# Patient Record
Sex: FEMALE | Race: WHITE | URBAN - METROPOLITAN AREA
[De-identification: names, ages, dates, MRNs, and addresses within clinical notes are randomized per-mention and may not be internally consistent; named-entity substitution may affect disease eponyms.]

---

## 2023-11-20 ENCOUNTER — TELEPHONE (OUTPATIENT)
Age: 28
End: 2023-11-20

## 2023-11-20 NOTE — TELEPHONE ENCOUNTER
Patient called and stated that she was released from the hospital yesterday and was tolds to make a hematology appt as soon as possible. Patient stated that she has a blood clot in her lung and really needs an appt. Informed patient that I would send a message to the referral coordinator who would reach back out to her to make an appt.        # [phone]

## 2023-11-21 NOTE — TELEPHONE ENCOUNTER
Spoke with patient regarding her hospital visit. She informed me that she was seen at [facility], and she is on an eliquis starter pack, iron supplements, and a antibiotic (Cefdinir). I informed her that we would need the records from [id number] [facility] prior to scheduling, she stated that she will give them a call to see what she can do.

## 2023-11-30 ENCOUNTER — TELEPHONE (OUTPATIENT)
Age: 28
End: 2023-11-30

## 2023-11-30 NOTE — TELEPHONE ENCOUNTER
Pt called and stated she was diagnosed with  pulmonary embolism at the ER and she was having chest pains and her New Patient Apt was with you was 12\1\2023 patient was asked to use her own decreation and go back to the ER because it maybe an Emergency pt bluntly hung up the phone. Then her mom called back and she was told the same thing mom also asked would the  Know that they called I told her yes there will be a message put back that she called in and she bluntly hung up.

## 2023-12-01 ENCOUNTER — OFFICE VISIT (OUTPATIENT)
Age: 28
End: 2023-12-01
Payer: MEDICAID

## 2023-12-01 VITALS
DIASTOLIC BLOOD PRESSURE: 76 MMHG | BODY MASS INDEX: 41.37 KG/M2 | SYSTOLIC BLOOD PRESSURE: 126 MMHG | HEIGHT: 59 IN | WEIGHT: 205.2 LBS

## 2023-12-01 DIAGNOSIS — R07.81 PLEURITIC PAIN: ICD-10-CM

## 2023-12-01 DIAGNOSIS — I26.93 SINGLE SUBSEGMENTAL PULMONARY EMBOLISM WITHOUT ACUTE COR PULMONALE (HCC): Primary | ICD-10-CM

## 2023-12-01 DIAGNOSIS — J90 PLEURAL EFFUSION: ICD-10-CM

## 2023-12-01 DIAGNOSIS — I10 ESSENTIAL HYPERTENSION: ICD-10-CM

## 2023-12-01 PROCEDURE — 3078F DIAST BP <80 MM HG: CPT | Performed by: INTERNAL MEDICINE

## 2023-12-01 PROCEDURE — 99205 OFFICE O/P NEW HI 60 MIN: CPT | Performed by: INTERNAL MEDICINE

## 2023-12-01 PROCEDURE — 3074F SYST BP LT 130 MM HG: CPT | Performed by: INTERNAL MEDICINE

## 2023-12-01 RX ORDER — DICLOFENAC SODIUM 30 MG/G
2 GEL TOPICAL 3 TIMES DAILY
Qty: 100 G | Refills: 3 | Status: SHIPPED | OUTPATIENT
Start: 2023-12-01

## 2023-12-01 RX ORDER — IBUPROFEN 800 MG/1
800 TABLET ORAL EVERY 6 HOURS PRN
COMMUNITY
End: 2023-12-01

## 2023-12-01 RX ORDER — LABETALOL 300 MG/1
300 TABLET, FILM COATED ORAL 2 TIMES DAILY
Qty: 60 TABLET | Refills: 5 | Status: SHIPPED | OUTPATIENT
Start: 2023-12-01 | End: 2023-12-01 | Stop reason: SDUPTHER

## 2023-12-01 RX ORDER — POLYETHYLENE GLYCOL 3350 17 G/17G
17 POWDER, FOR SOLUTION ORAL DAILY
COMMUNITY

## 2023-12-01 RX ORDER — LABETALOL 300 MG/1
300 TABLET, FILM COATED ORAL 2 TIMES DAILY
Qty: 60 TABLET | Refills: 5 | Status: SHIPPED | OUTPATIENT
Start: 2023-12-01

## 2023-12-01 RX ORDER — FERROUS SULFATE 325(65) MG
325 TABLET ORAL
COMMUNITY

## 2023-12-01 RX ORDER — LABETALOL 300 MG/1
300 TABLET, FILM COATED ORAL 2 TIMES DAILY
COMMUNITY
End: 2023-12-01 | Stop reason: SDUPTHER

## 2023-12-01 RX ORDER — LIDOCAINE 50 MG/G
1 PATCH TOPICAL DAILY
Qty: 30 PATCH | Refills: 3 | Status: SHIPPED | OUTPATIENT
Start: 2023-12-01 | End: 2023-12-01 | Stop reason: SDUPTHER

## 2023-12-01 RX ORDER — DICLOFENAC SODIUM 30 MG/G
2 GEL TOPICAL 3 TIMES DAILY
Qty: 100 G | Refills: 3 | Status: SHIPPED | OUTPATIENT
Start: 2023-12-01 | End: 2023-12-01 | Stop reason: SDUPTHER

## 2023-12-01 RX ORDER — LIDOCAINE 50 MG/G
1 PATCH TOPICAL DAILY
Qty: 30 PATCH | Refills: 3 | Status: SHIPPED | OUTPATIENT
Start: 2023-12-01 | End: 2024-03-30

## 2023-12-01 SDOH — ECONOMIC STABILITY: FOOD INSECURITY: WITHIN THE PAST 12 MONTHS, THE FOOD YOU BOUGHT JUST DIDN'T LAST AND YOU DIDN'T HAVE MONEY TO GET MORE.: NEVER TRUE

## 2023-12-01 SDOH — ECONOMIC STABILITY: HOUSING INSECURITY
IN THE LAST 12 MONTHS, WAS THERE A TIME WHEN YOU DID NOT HAVE A STEADY PLACE TO SLEEP OR SLEPT IN A SHELTER (INCLUDING NOW)?: NO

## 2023-12-01 SDOH — ECONOMIC STABILITY: INCOME INSECURITY: HOW HARD IS IT FOR YOU TO PAY FOR THE VERY BASICS LIKE FOOD, HOUSING, MEDICAL CARE, AND HEATING?: NOT HARD AT ALL

## 2023-12-01 SDOH — ECONOMIC STABILITY: FOOD INSECURITY: WITHIN THE PAST 12 MONTHS, YOU WORRIED THAT YOUR FOOD WOULD RUN OUT BEFORE YOU GOT MONEY TO BUY MORE.: NEVER TRUE

## 2023-12-01 ASSESSMENT — PATIENT HEALTH QUESTIONNAIRE - PHQ9
1. LITTLE INTEREST OR PLEASURE IN DOING THINGS: 0
2. FEELING DOWN, DEPRESSED OR HOPELESS: 0
SUM OF ALL RESPONSES TO PHQ QUESTIONS 1-9: 0
SUM OF ALL RESPONSES TO PHQ9 QUESTIONS 1 & 2: 0

## 2023-12-01 NOTE — PATIENT INSTRUCTIONS
Please call neurology (on referral/note from hospitalization) to schedule follow-up there. You can schedule in 3 or more months, as they wanted to see you after completed anticoagulation/Eliquis therapy.

## 2023-12-01 NOTE — PROGRESS NOTES
Teresita Wong (: 1995) is a 33 y.o. female, new patient, here for evaluation of the following chief complaint(s):  Chief Complaint   Patient presents with    New Patient       Assessment and Plan:      Diagnosis Orders   1. Single subsegmental pulmonary embolism without acute cor pulmonale (HCC)  [facility] - Hayde Good MD, Hematology/Oncology, [employer]    apixaban (ELIQUIS) 5 MG TABS tablet    DISCONTINUED: apixaban (ELIQUIS) 5 MG TABS tablet      2. Essential hypertension  labetalol (NORMODYNE) 300 MG tablet    DISCONTINUED: labetalol (NORMODYNE) 300 MG tablet      3. Pleural effusion  [facility] - Hayde Good MD, Hematology/Oncology, [employer]      4. Pleuritic pain  lidocaine (LIDODERM) 5 %    Diclofenac Sodium 3 % GEL    DISCONTINUED: lidocaine (LIDODERM) 5 %    DISCONTINUED: Diclofenac Sodium 3 % GEL          1:  Reviewed dosing and duration with pt and mom at visit. Plan hematology referral to clarify duration. Noted that on interval imaging at [facility] facility, PE has resolved. 2:  Refilled BP medication, as not OB/Gyn provider will not likely be managing in future. 3,4:  Monitor clinically--imaging reviewed from admission and interval follow-up visits. 4:  Medication(s), management and follow-up based on response reviewed at visit. Symptomatic management reviewed at visit. Reviewed typical course of illness, duration of symptoms, and exam findings. Medication dosing, side effects and alternatives reviewed. Return in about 4 weeks (around 2023) for Blood Pressure follow-up, referral follow-up--4-6 weeks, --needs 30min visit.  lab results and schedule of future lab studies reviewed with patient  reviewed medications and side effects in detail  radiology results and schedule of future radiology studies reviewed with patient    For additional documentation of information and/or recommendations discussed this visit, please see notes in instructions.     Plan and evaluation

## 2023-12-01 NOTE — PROGRESS NOTES
Reviewed history after retrieving [facility] documents prior to new pt visit for hospital follow-up. [id number] [facility] Hospitalization   Admit 11-16-23 through 11-18-23. 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 Transitioned to NOAC since not breastfeeding. Free Text DxA&P Notes  Free text DxA&P notes:  assessment/plan:  acute R distal pulmonary embolism  start full dose lovenox 1mg/kg sc bid  montior hgb w am labs  transition to noac upon dc- not breastfeeding. CM consulted for affordability eval.   f/u echo to eval for heart strain. severe sepsis with fever, tachycardia, leukocytosis, and end organ damage (AVA), improved  AVA, tachycardia, leukocytosis, fever resolved. lactic 2.1 on arrival, <2 after IVF ressuscitation  blood cultures pending  no pna on cta chest, pe's noted  on arrival abdomen mildly tender mostly tenderness on R back/ lower chest, normal bowel sounds, having bowel movements, no evidence of ileus,  no pain w eating, abdominal pain resolved  ct ab pelv showed dilated small bowel loops w/o transition point suspicios for small bowel ileus, no fluid collection, divertiulitis, or colitis reported  UA slightly positive, see below  Cdiff negative  continue ivf and rocephen for now, patient is improving nicely on current regimen.      UTI  slight

## 2023-12-01 NOTE — PROGRESS NOTES
Reviewed history after retrieving [facility] documents prior to new pt visit for hospital follow-up. [id number] [facility] visit 11-30-23:    CXR normal.    CT C/A/P:  Findings:    Chest CT:  Pulmonary Arteries: The main pulmonary artery and its visualized  proximal branches demonstrate no filling defects. The smaller branches  of bilateral pulmonary arteries are not evaluated secondary to  respiratory motion artifact. Heart/Mediastinum:  Heart size is within normal limits. No pericardial  effusion. No pathologic mediastinal or hilar adenopathy. Lungs/Pleura: Trace bilateral pleural effusions have mildly decreased. Vasculature: Aorta and pulmonary arteries are normal in size and  contour. Soft tissues: No acute soft tissue abnormalities. Osseous structures: No acute fracture. No evidence of suspicious  lytic or blastic lesion. Abdomen and pelvis CT:  Liver: No morphologic cirrhosis. No concerning focal hepatic lesions. Biliary:The gallbladder is unremarkable. No intrahepatic or  extrahepatic biliary ductal dilatation. Spleen: The spleen is normal in size without focal lesion. Pancreas: No solid mass. No main pancreatic ductal dilation. Adrenal glands: Unremarkable. Kidneys: The kidneys are normal in size and are symmetric. No  hydronephrosis. No renal calculi. No suspicious renal parenchymal  lesion. Stomach: Unremarkable. Bowel: No evidence of small or large bowel obstruction. Appendix: Appendix is not visualized. Peritoneal Cavity: No free intraperitoneal fluid or air. No  pathologic adenopathy. Retroperitoneum: No abdominal aortic aneurysm. No pathologic  retroperitoneal adenopathy. Pelvis:  Bladder: Unremarkable without focal wall thickening or mass. Reproductive Organs: Right ovarian 3.1 cm cyst, most likely follicle. Other:  Osseous structures: Bilateral pars defects at L5 without significant  spondylolisthesis. No evidence of suspicious lytic or blastic lesion.   Soft Tissues: New

## 2023-12-04 ENCOUNTER — TELEPHONE (OUTPATIENT)
Age: 28
End: 2023-12-04

## 2023-12-04 NOTE — TELEPHONE ENCOUNTER
PA for Lidocaine 5% patches  was sent to Ins plan today and came back saying Submit Bill To Other Processor Or Primary Payer

## 2023-12-04 NOTE — TELEPHONE ENCOUNTER
PA for Diclofenac Sodium 3% gel  was sent to Ins plan today and came back saying Submit Bill To Other Processor Or Primary Payer

## 2023-12-05 ENCOUNTER — TELEPHONE (OUTPATIENT)
Age: 28
End: 2023-12-05

## 2023-12-05 NOTE — TELEPHONE ENCOUNTER
Called patient to set up new patient appt. No answer.  Mailbox full    Np / PE in ER aidan / Wyatt Rick

## 2024-01-08 ENCOUNTER — OFFICE VISIT (OUTPATIENT)
Age: 29
End: 2024-01-08
Payer: MEDICAID

## 2024-01-08 VITALS
BODY MASS INDEX: 39.72 KG/M2 | SYSTOLIC BLOOD PRESSURE: 128 MMHG | OXYGEN SATURATION: 99 % | DIASTOLIC BLOOD PRESSURE: 82 MMHG | HEART RATE: 61 BPM | TEMPERATURE: 97.4 F | WEIGHT: 197 LBS | HEIGHT: 59 IN

## 2024-01-08 DIAGNOSIS — I26.93 SINGLE SUBSEGMENTAL PULMONARY EMBOLISM WITHOUT ACUTE COR PULMONALE (HCC): Primary | ICD-10-CM

## 2024-01-08 DIAGNOSIS — D64.9 ANEMIA, UNSPECIFIED TYPE: ICD-10-CM

## 2024-01-08 DIAGNOSIS — I10 ESSENTIAL HYPERTENSION: ICD-10-CM

## 2024-01-08 PROCEDURE — 3079F DIAST BP 80-89 MM HG: CPT | Performed by: INTERNAL MEDICINE

## 2024-01-08 PROCEDURE — 99215 OFFICE O/P EST HI 40 MIN: CPT | Performed by: INTERNAL MEDICINE

## 2024-01-08 PROCEDURE — 3074F SYST BP LT 130 MM HG: CPT | Performed by: INTERNAL MEDICINE

## 2024-01-08 RX ORDER — LABETALOL 300 MG/1
300 TABLET, FILM COATED ORAL 2 TIMES DAILY
Qty: 180 TABLET | Refills: 1 | Status: SHIPPED | OUTPATIENT
Start: 2024-01-08

## 2024-01-08 ASSESSMENT — PATIENT HEALTH QUESTIONNAIRE - PHQ9
1. LITTLE INTEREST OR PLEASURE IN DOING THINGS: 1
SUM OF ALL RESPONSES TO PHQ QUESTIONS 1-9: 4
8. MOVING OR SPEAKING SO SLOWLY THAT OTHER PEOPLE COULD HAVE NOTICED. OR THE OPPOSITE, BEING SO FIGETY OR RESTLESS THAT YOU HAVE BEEN MOVING AROUND A LOT MORE THAN USUAL: 0
7. TROUBLE CONCENTRATING ON THINGS, SUCH AS READING THE NEWSPAPER OR WATCHING TELEVISION: 0
5. POOR APPETITE OR OVEREATING: 1
2. FEELING DOWN, DEPRESSED OR HOPELESS: 1
9. THOUGHTS THAT YOU WOULD BE BETTER OFF DEAD, OR OF HURTING YOURSELF: 0
3. TROUBLE FALLING OR STAYING ASLEEP: 0
10. IF YOU CHECKED OFF ANY PROBLEMS, HOW DIFFICULT HAVE THESE PROBLEMS MADE IT FOR YOU TO DO YOUR WORK, TAKE CARE OF THINGS AT HOME, OR GET ALONG WITH OTHER PEOPLE: 0
SUM OF ALL RESPONSES TO PHQ QUESTIONS 1-9: 4
6. FEELING BAD ABOUT YOURSELF - OR THAT YOU ARE A FAILURE OR HAVE LET YOURSELF OR YOUR FAMILY DOWN: 0
SUM OF ALL RESPONSES TO PHQ QUESTIONS 1-9: 4
SUM OF ALL RESPONSES TO PHQ9 QUESTIONS 1 & 2: 2
SUM OF ALL RESPONSES TO PHQ QUESTIONS 1-9: 4
4. FEELING TIRED OR HAVING LITTLE ENERGY: 1

## 2024-01-08 NOTE — PROGRESS NOTES
RM17    Chief Complaint   Patient presents with    Follow-up     Pt stated she has a question about her hemoglobin beacuse she has a heavy period.       Vitals:    01/08/24 1409   BP: 128/82   Site: Left Upper Arm   Position: Sitting   Pulse: 61   Temp: 97.4 °F (36.3 °C)   TempSrc: Oral   SpO2: 99%   Weight: 89.4 kg (197 lb)   Height: 1.499 m (4' 11\")       \"Have you been to the ER, urgent care clinic since your last visit?  Hospitalized since your last visit?\"    NO    “Have you seen or consulted any other health care providers outside of [facility] since your last visit?”    NO    Health Maintenance Due   Topic Date Due    Hepatitis B vaccine (1 of 3 - 3-dose series) Never done    COVID-19 Vaccine (1) Never done    Varicella vaccine (1 of 2 - 2-dose childhood series) Never done    Pneumococcal 0-64 years Vaccine (1 - PCV) Never done    HIV screen  Never done    Hepatitis C screen  Never done    DTaP/Tdap/Td vaccine (1 - Tdap) Never done    Pap smear  Never done    Flu vaccine (1) Never done       AVS  education, follow up, and recommendations provided and addressed with patient.  services used to advise patient No

## 2024-01-08 NOTE — PATIENT INSTRUCTIONS
Please see gynecology as reviewed for heavier periods on Eliquis.    The labs done today should result in 24-48 hours.  Will have clinic call you with results.  We can fax to your gynecologist Dr. Boyce as well--at the [facility] location.

## 2024-01-08 NOTE — PROGRESS NOTES
Trang Vitale (: 1995) is a 28 y.o. female, established patient, here for evaluation of the following chief complaint(s):  Chief Complaint   Patient presents with    Follow-up     Pt stated she has a question about her hemoglobin bec she has heavy period.       Assessment and Plan:      Diagnosis Orders   1. Single subsegmental pulmonary embolism without acute cor pulmonale (HCC)  CBC with Auto Differential    Iron and TIBC    Ferritin      2. Anemia, unspecified type        3. Essential hypertension  labetalol (NORMODYNE) 300 MG tablet          1-2:  Lab monitoring reviewed with heavier menses on Eliquis.  Messaged hematology with staff message during visit to see if can see sooner--at [facility] location or with another location if available sooner.    There are other alternate locations as below--reviewed with pt at visit.    Continue current Eliquis pending labs and hematology evaluation.  Reviewed may be able to consider shorter than 6mo course since PE had resolved on prior imaging, pending further eval with hematology.    3:  Continue Labetalol--good control and no problems noted with medication or BID dosing.  Refill for 90-day supply reviewed.      Return in about 3 months (around 3/30/2024) for medication follow-up, --needs 30min visit, referral follow-up, Blood Pressure follow-up.  lab results and schedule of future lab studies reviewed with patient  reviewed medications and side effects in detail    For additional documentation of information and/or recommendations discussed this visit, please see notes in instructions.    Plan and evaluation (above) reviewed with pt at visit  Patient voiced understanding of plan and provided with time to ask/review questions.  After Visit Summary (AVS) provided to pt after visit with additional instructions as needed/reviewed.      Future Appointments   Date Time Provider Department Center   3/14/2024  1:00 PM Yonathan Cruz MD [facility]   --Updated

## 2024-01-10 LAB
BASOPHILS # BLD AUTO: 0.1 X10E3/UL (ref 0–0.2)
BASOPHILS NFR BLD AUTO: 1 %
EOSINOPHIL # BLD AUTO: 0.4 X10E3/UL (ref 0–0.4)
EOSINOPHIL NFR BLD AUTO: 5 %
ERYTHROCYTE [DISTWIDTH] IN BLOOD BY AUTOMATED COUNT: 14.9 % (ref 11.7–15.4)
FERRITIN SERPL-MCNC: 22 NG/ML (ref 15–150)
HCT VFR BLD AUTO: 39.2 % (ref 34–46.6)
HGB BLD-MCNC: 12.1 G/DL (ref 11.1–15.9)
IMM GRANULOCYTES # BLD AUTO: 0 X10E3/UL (ref 0–0.1)
IMM GRANULOCYTES NFR BLD AUTO: 0 %
IRON SATN MFR SERPL: 26 % (ref 15–55)
IRON SERPL-MCNC: 96 UG/DL (ref 27–159)
LYMPHOCYTES # BLD AUTO: 2.7 X10E3/UL (ref 0.7–3.1)
LYMPHOCYTES NFR BLD AUTO: 31 %
MCH RBC QN AUTO: 26.6 PG (ref 26.6–33)
MCHC RBC AUTO-ENTMCNC: 30.9 G/DL (ref 31.5–35.7)
MCV RBC AUTO: 86 FL (ref 79–97)
MONOCYTES # BLD AUTO: 0.6 X10E3/UL (ref 0.1–0.9)
MONOCYTES NFR BLD AUTO: 7 %
NEUTROPHILS # BLD AUTO: 5 X10E3/UL (ref 1.4–7)
NEUTROPHILS NFR BLD AUTO: 56 %
PLATELET # BLD AUTO: 288 X10E3/UL (ref 150–450)
RBC # BLD AUTO: 4.55 X10E6/UL (ref 3.77–5.28)
TIBC SERPL-MCNC: 367 UG/DL (ref 250–450)
UIBC SERPL-MCNC: 271 UG/DL (ref 131–425)
WBC # BLD AUTO: 8.8 X10E3/UL (ref 3.4–10.8)

## 2024-01-15 ENCOUNTER — OFFICE VISIT (OUTPATIENT)
Age: 29
End: 2024-01-15
Payer: MEDICAID

## 2024-01-15 VITALS
WEIGHT: 199.6 LBS | HEART RATE: 62 BPM | SYSTOLIC BLOOD PRESSURE: 116 MMHG | BODY MASS INDEX: 40.24 KG/M2 | DIASTOLIC BLOOD PRESSURE: 75 MMHG | HEIGHT: 59 IN | OXYGEN SATURATION: 98 % | RESPIRATION RATE: 16 BRPM | TEMPERATURE: 97.3 F

## 2024-01-15 DIAGNOSIS — D68.59 HYPERCOAGULABLE STATE (HCC): ICD-10-CM

## 2024-01-15 DIAGNOSIS — D68.59 HYPERCOAGULABLE STATE (HCC): Primary | ICD-10-CM

## 2024-01-15 DIAGNOSIS — Z72.0 CURRENT EVERY DAY NICOTINE VAPING: ICD-10-CM

## 2024-01-15 PROCEDURE — 99204 OFFICE O/P NEW MOD 45 MIN: CPT | Performed by: INTERNAL MEDICINE

## 2024-01-15 RX ORDER — LURASIDONE HYDROCHLORIDE 60 MG/1
60 TABLET, FILM COATED ORAL
COMMUNITY

## 2024-01-15 ASSESSMENT — PATIENT HEALTH QUESTIONNAIRE - PHQ9
SUM OF ALL RESPONSES TO PHQ QUESTIONS 1-9: 1
2. FEELING DOWN, DEPRESSED OR HOPELESS: 1
1. LITTLE INTEREST OR PLEASURE IN DOING THINGS: 0
SUM OF ALL RESPONSES TO PHQ QUESTIONS 1-9: 1
SUM OF ALL RESPONSES TO PHQ9 QUESTIONS 1 & 2: 1

## 2024-01-15 NOTE — PROGRESS NOTES
[facility] [place] [facility]  [address], [address] [facility] [address]  W: [phone]  F: [phone] Patient ID  Name: Trang Vitale  YOB: 1995  MRN: [id number]  Referring Provider:   Alexsander Kang MD  [address] [place] [address]  Primary Care Provider:   Alexsander Kang MD       HEMATOLOGY/MEDICAL ONCOLOGY  NOTE     Reason for Evaluation:     Chief Complaint   Patient presents with    New Patient     Hematology History:   Please review original records for clinical decision making. This summary highlights focused aspects of patient's ongoing care and may have a recurring section in notes with either updates or remain unchanged as a longitudinal care summary.  ______________________________________________________  DIAGNOSIS: PULMONARY EMBOLISM  10/4/23: .  23:  ER Right Distal Pulmonary Artery Positive for PE but no heart strain per ER notes reviewed.  23:  ER CT for PE: negative  23:  ER presentation at  for chest pain/flank pain.    Subjective:     History of Present Illness:   Date of Visit: 01/15/24   Trang Vitale is a 28 y.o. female who presents for an initial evaluation for HYPERCOAGULABLE STATE.  She notes that she was hosptialized for initial PE and was started on a heparin gtt then switched to lovenox. She notes being on lovenox for about 4 days then switched to Eliquis. She has been taking Eliquis and denies any bleeding other than heavier periods. She continues on iron. She notes that she had her iron studies checked. She notes that she had iron studies with primary care clinic.    She reportedly had two post-partum hemorrhages in October after her . She delivered at [facility]. She reportedly was on Magnesium after delivery. Patient and her mom notes that she did not have any known clotting medications after her bleeding.  She delivered with Dr. Laya Pizarro (at

## 2024-01-15 NOTE — PROGRESS NOTES
Chief Complaint   Patient presents with    New Patient           Vitals:    01/15/24 1120   BP: 116/75   Pulse: 62   Resp: 16   Temp: 97.3 °F (36.3 °C)   SpO2: 98%            1. Have you been to the ER, urgent care clinic since your last visit?  Hospitalized since your last visit?  New Patient  2. Have you seen or consulted any other health care providers outside of the [facility] since your last visit?  Include any pap smears or colon screening. New Patient

## 2024-01-18 PROBLEM — D68.59 HYPERCOAGULABLE STATE (HCC): Status: ACTIVE | Noted: 2024-01-18

## 2024-02-23 ENCOUNTER — OFFICE VISIT (OUTPATIENT)
Age: 29
End: 2024-02-23

## 2024-02-23 DIAGNOSIS — Z86.711 HISTORY OF PULMONARY EMBOLISM: Primary | ICD-10-CM

## 2024-02-23 NOTE — PROGRESS NOTES
Trang Vitale  :  1995    Diagnoses and all orders for this visit:    History of pulmonary embolism--did not come for scheduled visit today.        Pre-chartin24:      She has lab orders and follow-up with hematology as below but labs not done yet.    Future Appointments   Date Time Provider Department Center   2024  1:45 PM Yonathan Cruz MD [facility]     Labs ordered by hematology:        [facility] records reviewed prior to visit (pt did not come to scheduled visit today).  2-15-24:  ER Provider report:      Text/Dict Note Patient presents for right-sided chest wall/rib pain that worsens with movement. She describes it as a spasm like sensation. Patient does have history of PE but is compliant with her anticoagulation. She is concerned because she states that is similar to previous pain she had with a PE. Plan for baseline labs, CT imaging of the chest, and will reassess. Labs overall reassuring. Screening troponin negative. EKG without acute ischemia. CTA of the chest without acute process or evidence of pulmonary embolism. Suspect patient likely has muscle strain/spasm causing her discomfort. She describes it as a spasm like sensation. She also states she has been sleeping awkwardly as she has had her child in the bed with a slight cough. Discussed continued symptomatic management home with acetaminophen, muscle relaxant, topical lidocaine patches. At this time do not believe further workup or imaging is indicated. Do not suspect ACS.    Central Nervous System Agents   Patient Discharge & Departure Vital Signs/Condition   Vital Signs First Documented: Last Documented: All vital signs available at the time of this entry have been reviewed.   Condition Stable Clinical Impression   Clinical Impression   Medication Dose Sig/Devonte Route Start time Stop Time Status Last Admin   --Ketorolac Tromethamine 15 MG X1 (ED) STA IV 02/15 1549 02/15 1550 DC 02/15 1610 Result Date Time

## 2024-02-25 NOTE — PROGRESS NOTES
[facility] [place] at [facility]  [address], [address] [facility] [address]  W: [phone]  F: [phone] Patient ID  Name: Trang Vitale  YOB: 1995  MRN: [id number]  Referring Provider:   No referring provider defined for this encounter.  Primary Care Provider:   Alexsander Kang MD       HEMATOLOGY/MEDICAL ONCOLOGY  NOTE     Reason for Evaluation:     No chief complaint on file.    Hematology History:   Please review original records for clinical decision making. This summary highlights focused aspects of patient's ongoing care and may have a recurring section in notes with either updates or remain unchanged as a longitudinal care summary.  ______________________________________________________  DIAGNOSIS: PULMONARY EMBOLISM  10/4/23: .  23:  ER Right Distal Pulmonary Artery Positive for PE but no heart strain per ER notes reviewed.  23:  ER CT for PE: negative  23:  ER presentation at  for chest pain/flank pain.    MRV Head Without Contrast (23) at OSH: IMPRESSION: significantly diminished flow-related enhancement of left transverse and sigmoid dural venous sinnuses which may be related to congenital hypoplasia, severe setnosis, or chronic partial occlusion/thrombosis (less likely) and high-grade focal stenosis of the right transverse dural venous sinus.      Subjective:     History of Present Illness:   DATE OF VISIT: 24   Trang Vitale is a 28 y.o. female who presents for a follow-up evaluation for Hypercoagulable state with history of hospitalization for PE in 2023.  Feels well today, takes Eliquis BID as prescribed.   No hospitalizations or blood clots since last visit.   Reports occasional headaches she attributes to stress and history of migraines, these are worse around her periods.    Denies current pregnancy, s/p tubal ligation().  States heavy periods occur with Eliquis but manageable overall.

## 2024-02-26 ENCOUNTER — OFFICE VISIT (OUTPATIENT)
Age: 29
End: 2024-02-26
Payer: MEDICAID

## 2024-02-26 VITALS
HEIGHT: 59 IN | TEMPERATURE: 98 F | HEART RATE: 81 BPM | DIASTOLIC BLOOD PRESSURE: 77 MMHG | BODY MASS INDEX: 40.8 KG/M2 | SYSTOLIC BLOOD PRESSURE: 128 MMHG | OXYGEN SATURATION: 97 % | WEIGHT: 202.4 LBS | RESPIRATION RATE: 22 BRPM

## 2024-02-26 DIAGNOSIS — D68.59 HYPERCOAGULABLE STATE (HCC): Primary | ICD-10-CM

## 2024-02-26 DIAGNOSIS — Z72.0 CURRENT EVERY DAY NICOTINE VAPING: ICD-10-CM

## 2024-02-26 PROCEDURE — 99214 OFFICE O/P EST MOD 30 MIN: CPT

## 2024-02-26 NOTE — PROGRESS NOTES
Chief Complaint   Patient presents with    Follow-up           Vitals:    02/26/24 1405   BP: 128/77   Pulse: 81   Resp: 22   Temp: 98 °F (36.7 °C)   SpO2: 97%            1. Have you been to the ER, urgent care clinic since your last visit?  Hospitalized since your last visit?  No  2. Have you seen or consulted any other health care providers outside of the [facility] since your last visit?  Include any pap smears or colon screening. No

## 2024-02-28 LAB
AT III ACT/NOR PPP CHRO: 123 % (ref 75–135)
D DIMER PPP FEU-MCNC: <0.2 MG/L FEU (ref 0–0.49)
PROT C ACT/NOR PPP: 126 % (ref 73–180)
PROT S ACT/NOR PPP: 77 % (ref 63–140)
PROT S AG ACT/NOR PPP IA: 76 % (ref 60–150)
PROT S FREE AG ACT/NOR PPP IA: 71 % (ref 61–136)

## 2024-02-29 LAB
B2 GLYCOPROT1 IGA SER-ACNC: <9 GPI IGA UNITS (ref 0–25)
B2 GLYCOPROT1 IGG SER-ACNC: <9 GPI IGG UNITS (ref 0–20)
B2 GLYCOPROT1 IGM SER-ACNC: <9 GPI IGM UNITS (ref 0–32)
CARDIOLIPIN IGA SER IA-ACNC: <9 APL U/ML (ref 0–11)
CARDIOLIPIN IGG SER IA-ACNC: <9 GPL U/ML (ref 0–14)
CARDIOLIPIN IGM SER IA-ACNC: <9 MPL U/ML (ref 0–12)

## 2024-03-01 LAB — PROT C AG ACT/NOR PPP IA: 130 % (ref 60–150)

## 2024-03-07 LAB
APTT HEX PL PPP: 6 SEC
APTT IMM NP PPP: ABNORMAL SEC
APTT PPP 1:1 SALINE: ABNORMAL SEC
APTT PPP: 27.4 SEC
B2 GLYCOPROT1 IGA SER-ACNC: <10 SAU
B2 GLYCOPROT1 IGG SER-ACNC: <10 SGU
B2 GLYCOPROT1 IGM SER-ACNC: <10 SMU
CARDIOLIPIN IGG SER IA-ACNC: <10 GPL
CARDIOLIPIN IGM SER IA-ACNC: <10 MPL
CONFIRM APTT: 0.5 SEC
CONFIRM DRVVT: 45.5 SEC
DRVVT SCREEN TO CONFIRM RATIO: 1 RATIO
INR PPP: 1 RATIO
LABORATORY COMMENT REPORT: ABNORMAL
PROTHROMBIN TIME: 10.5 SEC
SCREEN DRVVT: 52 SEC
THROMBIN TIME: 17.3 SEC

## 2024-03-11 LAB
F2 C.20210G>A GENO BLD/T: NORMAL
F5 P.R506Q BLD/T QL: NORMAL
IMP & REVIEW OF LAB RESULTS: NORMAL
IMP & REVIEW OF LAB RESULTS: NORMAL

## 2024-03-25 ENCOUNTER — TELEPHONE (OUTPATIENT)
Age: 29
End: 2024-03-25

## 2024-03-25 NOTE — TELEPHONE ENCOUNTER
Patient called back and stated her insurance dropped her so she's unable to make appointments and get her medications. Patient would like to be advised on what to do about her blood thinners.   CB#[phone]

## 2024-03-25 NOTE — TELEPHONE ENCOUNTER
3/25/24- Called [facility] to check status of patients Eliquis prescription and to see if patient is still able to fill this. Per the pharmacy tech patient had this order sent to another [facility] but it has a $0 copay and patient is able to fill at [facility] today. Informed pharmacy tech that this user will reach out to patient to see what she would like to do and for patient to reach out to [facility].     Called patient and left a voicemail with the above information but also requested a call back from patient. Awaiting return call.    Patient called back stating she received a letter that [employer] is dropping her insurance and now she is signed up for a Medicare plan but it doesn't cover any prescriptions or doctors visits or even the hospital stays so it only covers if she had to go to a nursing home. Patient inquired about a  in the office. Informed patient this user will forward patients info to Marianne  in our office on next steps for insurance.     Inquired how much Eliquis patient has on hand. Patient stated 1 pill. This user urged patient to call [facility] once off the phone to fill the Eliquis if able to get the one fill for $0 copay. This user informed patient that [employer] has a financial assistance program patient may qualify for and this user will get the application ready. Patient verbalized understanding.     Patient stated she would also like a call back from the nurse because she is concerned about her bloodwork results and at this point doesn't know when she will be able to return to the office if her current insurance plan doesn't cover doctors visits. Informed patient this conversation and patients concerns will be shared with the team. Patient verbalized understanding and was appreciative of the call.

## 2024-03-26 NOTE — TELEPHONE ENCOUNTER
[facility]  Oncology Social Work  Encounter     Patient Name:  Trang Vitale     Medical History: heme    Advance Directives: none on file; conversation deferred    Narrative: Social work referral received. Patient is losing Medicaid insurance coverage. Called patient and left voicemail message requesting a return call to discuss insurance options.    Barriers to Care: underinsured     Plan:     Referral/Handouts:   Insurance/Entitlements referral    Marianne Huff LCSW  Clinical Social Work Medical Oncology   [facility]  [address]  [place]  [address]  W: [phone]  F: [phone]

## 2024-03-27 NOTE — TELEPHONE ENCOUNTER
[facility]  [phone]    03/27/24 12:01 PM EDT - Called and spoke with the patient.  Patient's ID verified x 2.  Advised this nurse is awaiting a result note on her recent labs and will call her back once this is placed by Dr. Cruz.  The patient inquired what will happen when she is unable to take her Eliquis since she is uninsured.  Advised this puts her at an increased risk of developing blood clots.  The patient states she has a 2-month supply left then will run out.  After 2 months, she will have to pay $1000 per prescription and is unable to do this.  She states after the 2 months is over she will have been on the eliquis for 6 months.  She would like to know if she can stop taking the medication at that point.  Advised this nurse would update Dr. Cruz of above and call back with recommendations.  The patient voiced understanding and gratitude for the call.  No further questions or concerns.           [facility]  [phone]    03/28/24 3:58 PM EDT - Attempted to reach the patient to advise her of Dr. Cruz's result note below and this nurse is still awaiting a response from Dr. Cruz regarding stopping the Eliquis.  There was no answer.  Left a voicemail for the patient to call back at their earliest convenience along with the phone number to our office.        Result note: \"Please let her know that the labs showed no significant findings.\"        [facility]  [phone]    03/29/24 2:54 PM EDT - Attempted to reach the patient to advise her of Dr. Cruz's result note above and advise Dr. Cruz said it is okay to stop the Eliquis after 6 months but to please follow-up with her PCP as well.  There was no answer.  Left a voicemail for the patient to call back at their earliest convenience along with the phone number to our office.

## 2024-04-03 ENCOUNTER — TELEPHONE (OUTPATIENT)
Age: 29
End: 2024-04-03

## 2024-04-03 NOTE — TELEPHONE ENCOUNTER
----- Message from Chely Adam sent at 4/3/2024 10:42 AM EDT -----  Subject: Referral Request    Reason for referral request? Neurologist   Provider patient wants to be referred to(if known):     Provider Phone Number(if known):    Additional Information for Provider?   ---------------------------------------------------------------------------  --------------  CALL BACK INFO    [phone]; OK to leave message on voicemail  ---------------------------------------------------------------------------  --------------

## 2024-04-29 ENCOUNTER — OFFICE VISIT (OUTPATIENT)
Age: 29
End: 2024-04-29
Payer: MEDICARE

## 2024-04-29 VITALS
HEIGHT: 59 IN | TEMPERATURE: 98.2 F | WEIGHT: 198 LBS | BODY MASS INDEX: 39.92 KG/M2 | OXYGEN SATURATION: 98 % | RESPIRATION RATE: 20 BRPM | SYSTOLIC BLOOD PRESSURE: 129 MMHG | DIASTOLIC BLOOD PRESSURE: 56 MMHG | HEART RATE: 67 BPM

## 2024-04-29 DIAGNOSIS — Z72.0 CURRENT EVERY DAY NICOTINE VAPING: ICD-10-CM

## 2024-04-29 DIAGNOSIS — D68.59 HYPERCOAGULABLE STATE (HCC): Primary | ICD-10-CM

## 2024-04-29 PROCEDURE — 99214 OFFICE O/P EST MOD 30 MIN: CPT | Performed by: INTERNAL MEDICINE

## 2024-04-29 NOTE — PATIENT INSTRUCTIONS
or 99th percentile  interval.  Reference Range:  Negative: <26      Interpretation 02/27/2024 Comment   Final    Comment: A lupus anticoagulant is not detected. All antiphospholipid  antibodies evaluated are normal. As antibody titers may  fluctuate with time, repeat testing may be indicated.  Please contact [employer] if further  clarification is needed.      Protein S Ag, Total 02/27/2024 76  60 - 150 % Final    Comment: This test was developed and its performance characteristics  determined by [employer]. It has not been cleared or approved  by the Food and Drug Administration.      Protein S Ag, Free 02/27/2024 71  61 - 136 % Final    Beta 2 Glycoprot.1 IgG Ab 02/26/2024 <9  0 - 20 GPI IgG units Final    Comment: The reference interval reflects a 3SD or 99th percentile interval,  which is thought to represent a potentially clinically significant  result in accordance with the International Consensus Statement on  the classification criteria for definitive antiphospholipid syndrome  (APS). J Thromb Haem 2006;4:295-306.      Beta 2 Glycoprot.1 IgA Ab 02/26/2024 <9  0 - 25 GPI IgA units Final    Comment: The reference interval reflects a 3SD or 99th percentile interval,  which is thought to represent a potentially clinically significant  result in accordance with the International Consensus Statement on  the classification criteria for definitive antiphospholipid syndrome  (APS). J Thromb Haem 2006;4:295-306.      Beta 2 Glycoprot.1 IgM Ab 02/26/2024 <9  0 - 32 GPI IgM units Final    Comment: The reference interval reflects a 3SD or 99th percentile interval,  which is thought to represent a potentially clinically significant  result in accordance with the International Consensus Statement on  the classification criteria for definitive antiphospholipid syndrome  (APS). J Thromb Haem 2006;4:295-306.      Cardiolipin Antibody, IgG 02/26/2024 <9  0 - 14 GPL U/mL Final    Comment:

## 2024-04-29 NOTE — PROGRESS NOTES
[facility]  [address], [address] [facility] [address]  W: [phone]  F: [phone] Patient ID  Name: Trang Vitale  YOB: 1995  MRN: [id number]  Referring Provider:   No referring provider defined for this encounter.  Primary Care Provider:   Alexsander Kang MD       HEMATOLOGY/MEDICAL ONCOLOGY  NOTE     Reason for Evaluation:     Chief Complaint   Patient presents with    Follow-up     Hematology History:   Please review original records for clinical decision making. This summary highlights focused aspects of patient's ongoing care and may have a recurring section in notes with either updates or remain unchanged as a longitudinal care summary.  ______________________________________________________  DIAGNOSIS: PULMONARY EMBOLISM  10/4/23: .  23:  ER Right Distal Pulmonary Artery Positive for PE but no heart strain per ER notes reviewed.  23:  ER CT for PE: negative  23:  ER presentation at  for chest pain/flank pain.    Subjective:     History of Present Illness:   Date of Visit: 24   Trang Vitale is a 28 y.o. female who presents for a follow-up evaluation for HYPERCOAGULABLE STATE..             Patient overall reports feeling stable. She denies any issues. Denies any bleeding. Still has an eliquis supply.  Past Medical History:   Diagnosis Date    ADHD (attention deficit hyperactivity disorder)     Anxiety     Bipolar 1 disorder (HCC)     Hypertension     Pulmonary embolism (HCC)       Past Surgical History:   Procedure Laterality Date     SECTION       SECTION      2 total      Social History     Tobacco Use    Smoking status: Former     Average packs/day: 0.2 packs/day for 10.3 years (2.5 ttl pk-yrs)     Types: Cigarettes     Start date:     Smokeless tobacco: Never   Substance Use Topics    Alcohol use: Yes     Comment: OCC      Family History   Problem Relation Age of Onset

## 2024-04-29 NOTE — PROGRESS NOTES
Chief Complaint   Patient presents with    Follow-up           Vitals:    04/29/24 1350   BP: (!) 129/56   Pulse: 67   Resp: 20   Temp: 98.2 °F (36.8 °C)   SpO2: 98%            1. Have you been to the ER, urgent care clinic since your last visit?  Hospitalized since your last visit?  No  2. Have you seen or consulted any other health care providers outside of the [facility] since your last visit?  Include any pap smears or colon screening. No

## 2024-04-30 ENCOUNTER — TELEPHONE (OUTPATIENT)
Age: 29
End: 2024-04-30

## 2024-04-30 NOTE — TELEPHONE ENCOUNTER
Lm with patient to schedule follow up appointment.    Return in about 1 year (around 4/29/2025).     Gave her office number to call back.

## 2024-05-02 ENCOUNTER — OFFICE VISIT (OUTPATIENT)
Age: 29
End: 2024-05-02
Payer: MEDICARE

## 2024-05-02 ENCOUNTER — TELEPHONE (OUTPATIENT)
Age: 29
End: 2024-05-02

## 2024-05-02 VITALS
RESPIRATION RATE: 20 BRPM | WEIGHT: 197.2 LBS | HEART RATE: 64 BPM | OXYGEN SATURATION: 95 % | DIASTOLIC BLOOD PRESSURE: 73 MMHG | HEIGHT: 59 IN | BODY MASS INDEX: 39.76 KG/M2 | SYSTOLIC BLOOD PRESSURE: 109 MMHG | TEMPERATURE: 97.5 F

## 2024-05-02 DIAGNOSIS — E61.1 IRON DEFICIENCY: ICD-10-CM

## 2024-05-02 DIAGNOSIS — Z86.2 HISTORY OF ANEMIA: ICD-10-CM

## 2024-05-02 DIAGNOSIS — I10 ESSENTIAL HYPERTENSION: Primary | ICD-10-CM

## 2024-05-02 DIAGNOSIS — R73.03 PREDIABETES: ICD-10-CM

## 2024-05-02 PROCEDURE — 3078F DIAST BP <80 MM HG: CPT | Performed by: INTERNAL MEDICINE

## 2024-05-02 PROCEDURE — 99214 OFFICE O/P EST MOD 30 MIN: CPT | Performed by: INTERNAL MEDICINE

## 2024-05-02 PROCEDURE — 3074F SYST BP LT 130 MM HG: CPT | Performed by: INTERNAL MEDICINE

## 2024-05-02 RX ORDER — LABETALOL 200 MG/1
200 TABLET, FILM COATED ORAL 2 TIMES DAILY
Qty: 180 TABLET | Refills: 1 | Status: SHIPPED | OUTPATIENT
Start: 2024-05-02

## 2024-05-02 RX ORDER — SEMAGLUTIDE 0.5 MG/.5ML
0.5 INJECTION, SOLUTION SUBCUTANEOUS
Qty: 2 ML | Refills: 2 | Status: SHIPPED | OUTPATIENT
Start: 2024-05-30

## 2024-05-02 SDOH — ECONOMIC STABILITY: FOOD INSECURITY: WITHIN THE PAST 12 MONTHS, THE FOOD YOU BOUGHT JUST DIDN'T LAST AND YOU DIDN'T HAVE MONEY TO GET MORE.: NEVER TRUE

## 2024-05-02 SDOH — ECONOMIC STABILITY: FOOD INSECURITY: WITHIN THE PAST 12 MONTHS, YOU WORRIED THAT YOUR FOOD WOULD RUN OUT BEFORE YOU GOT MONEY TO BUY MORE.: NEVER TRUE

## 2024-05-02 SDOH — ECONOMIC STABILITY: INCOME INSECURITY: HOW HARD IS IT FOR YOU TO PAY FOR THE VERY BASICS LIKE FOOD, HOUSING, MEDICAL CARE, AND HEATING?: NOT HARD AT ALL

## 2024-05-02 ASSESSMENT — PATIENT HEALTH QUESTIONNAIRE - PHQ9
5. POOR APPETITE OR OVEREATING: NOT AT ALL
SUM OF ALL RESPONSES TO PHQ QUESTIONS 1-9: 0
7. TROUBLE CONCENTRATING ON THINGS, SUCH AS READING THE NEWSPAPER OR WATCHING TELEVISION: NOT AT ALL
SUM OF ALL RESPONSES TO PHQ9 QUESTIONS 1 & 2: 0
1. LITTLE INTEREST OR PLEASURE IN DOING THINGS: NOT AT ALL
SUM OF ALL RESPONSES TO PHQ QUESTIONS 1-9: 0
8. MOVING OR SPEAKING SO SLOWLY THAT OTHER PEOPLE COULD HAVE NOTICED. OR THE OPPOSITE, BEING SO FIGETY OR RESTLESS THAT YOU HAVE BEEN MOVING AROUND A LOT MORE THAN USUAL: NOT AT ALL
4. FEELING TIRED OR HAVING LITTLE ENERGY: NOT AT ALL
9. THOUGHTS THAT YOU WOULD BE BETTER OFF DEAD, OR OF HURTING YOURSELF: NOT AT ALL
6. FEELING BAD ABOUT YOURSELF - OR THAT YOU ARE A FAILURE OR HAVE LET YOURSELF OR YOUR FAMILY DOWN: NOT AT ALL
SUM OF ALL RESPONSES TO PHQ QUESTIONS 1-9: 0
SUM OF ALL RESPONSES TO PHQ QUESTIONS 1-9: 0
3. TROUBLE FALLING OR STAYING ASLEEP: NOT AT ALL
10. IF YOU CHECKED OFF ANY PROBLEMS, HOW DIFFICULT HAVE THESE PROBLEMS MADE IT FOR YOU TO DO YOUR WORK, TAKE CARE OF THINGS AT HOME, OR GET ALONG WITH OTHER PEOPLE: NOT DIFFICULT AT ALL
2. FEELING DOWN, DEPRESSED OR HOPELESS: NOT AT ALL

## 2024-05-02 NOTE — TELEPHONE ENCOUNTER
Spoke with patient to schedule her 1 year follow up and she stated she will call back at a later date to schedule.

## 2024-05-02 NOTE — TELEPHONE ENCOUNTER
Pt called stated that she just left her appt today (5\2\2024) and the medication you prescribed is not covered by her insurance she is asking for another medication and is asking for another injectable a cheaper one.     Also, Did you want to give her metformin if its covered under her insurance    Please call patient with any update

## 2024-05-02 NOTE — PROGRESS NOTES
Trang Vitale (: 1995) is a 28 y.o. female, established patient, here for evaluation of the following chief complaint(s):  Chief Complaint   Patient presents with    Follow-up       Assessment and Plan:      Diagnosis Orders   1. Essential hypertension  labetalol (NORMODYNE) 200 MG tablet    Comprehensive Metabolic Panel    Comprehensive Metabolic Panel      2. History of anemia  CBC with Auto Differential    Iron and TIBC    Ferritin    Ferritin    Iron and TIBC    CBC with Auto Differential      3. Prediabetes  Semaglutide-Weight Management (WEGOVY) 0.25 MG/0.5ML SOAJ SC injection    Semaglutide-Weight Management (WEGOVY) 0.5 MG/0.5ML SOAJ SC injection    metFORMIN (GLUCOPHAGE-XR) 500 MG extended release tablet      4. BMI 39.0-39.9,adult  metFORMIN (GLUCOPHAGE-XR) 500 MG extended release tablet      5. Iron deficiency  ferrous sulfate (IRON 325) 325 (65 Fe) MG tablet          1:  Reduce dose and monitor as reviewed.  Lab monitoring reviewed.    2:  Lab monitoring reviewed.    3-4:  Medication(s), management and follow-up based on response reviewed at visit (Wegovy).    Metformin sent in after visit--Wegovy not covered by insurance.    5:  Supplement sent after visit as well.      Return in about 2 months (around 2024) for medication follow-up, non-fasting labs (POC A1c), --needs 30min visit.  lab results and schedule of future lab studies reviewed with patient  reviewed medications and side effects in detail    Plan and evaluation (above) reviewed with pt at visit  Patient voiced understanding of plan and provided with time to ask/review questions.  After Visit Summary (AVS) provided to pt after visit with additional instructions as needed/reviewed.      Future Appointments   Date Time Provider Department Center   2024 11:15 AM Alexsander Kang MD [facility]   --Updated future visits after patient check-out.  --Appt above scheduled by LOPEZ (TC) at visit today.      History of Present Illness: 
Rm: 17  Fasting: Yes  VFC No    NA - based on age    Chief Complaint   Patient presents with    Follow-up       /73 (Site: Left Upper Arm, Position: Sitting, Cuff Size: Large Adult)   Pulse 64   Temp 97.5 °F (36.4 °C) (Temporal)   Resp 20   Ht 1.499 m (4' 11\")   Wt 89.4 kg (197 lb 3.2 oz)   SpO2 95%   BMI 39.83 kg/m²     1. Have you been to the ER, urgent care clinic since your last visit?  Hospitalized since your last visit?No    2. Have you seen or consulted any other health care providers outside of the [facility] since your last visit?  Include any pap smears or colon screening. Yes Where: OBGYN          Social Determinants of Health     Tobacco Use: Medium Risk (5/2/2024)    Patient History     Smoking Tobacco Use: Former     Smokeless Tobacco Use: Never     Passive Exposure: Not on file   Alcohol Use: Not on file   Financial Resource Strain: Low Risk  (5/2/2024)    Overall Financial Resource Strain (CARDIA)     Difficulty of Paying Living Expenses: Not hard at all   Food Insecurity: No Food Insecurity (5/2/2024)    Hunger Vital Sign     Worried About Running Out of Food in the Last Year: Never true     Ran Out of Food in the Last Year: Never true   Transportation Needs: Unknown (5/2/2024)    PRAPARE - Transportation     Lack of Transportation (Medical): Not on file     Lack of Transportation (Non-Medical): No   Physical Activity: Not on file   Stress: Not on file   Social Connections: Not on file   Intimate Partner Violence: Not on file   Depression: Not at risk (5/2/2024)    PHQ-2     PHQ-2 Score: 0   Housing Stability: Unknown (5/2/2024)    Housing Stability Vital Sign     Unable to Pay for Housing in the Last Year: Not on file     Number of Places Lived in the Last Year: Not on file     Unstable Housing in the Last Year: No   Interpersonal Safety: Not on file   Utilities: Not on file      
Patient was informed of the reason for this intervention.

## 2024-05-03 LAB
ALBUMIN SERPL-MCNC: 4.7 G/DL (ref 3.5–5)
ALBUMIN/GLOB SERPL: 1.4 (ref 1.1–2.2)
ALP SERPL-CCNC: 69 U/L (ref 45–117)
ALT SERPL-CCNC: 33 U/L (ref 12–78)
ANION GAP SERPL CALC-SCNC: 4 MMOL/L (ref 5–15)
AST SERPL-CCNC: 15 U/L (ref 15–37)
BASOPHILS # BLD: 0.1 K/UL (ref 0–0.1)
BASOPHILS NFR BLD: 1 % (ref 0–1)
BILIRUB SERPL-MCNC: 0.9 MG/DL (ref 0.2–1)
BUN SERPL-MCNC: 14 MG/DL (ref 6–20)
BUN/CREAT SERPL: 14 (ref 12–20)
CALCIUM SERPL-MCNC: 10.1 MG/DL (ref 8.5–10.1)
CHLORIDE SERPL-SCNC: 107 MMOL/L (ref 97–108)
CO2 SERPL-SCNC: 26 MMOL/L (ref 21–32)
CREAT SERPL-MCNC: 1.03 MG/DL (ref 0.55–1.02)
DIFFERENTIAL METHOD BLD: ABNORMAL
EOSINOPHIL # BLD: 0.6 K/UL (ref 0–0.4)
EOSINOPHIL NFR BLD: 7 % (ref 0–7)
ERYTHROCYTE [DISTWIDTH] IN BLOOD BY AUTOMATED COUNT: 14.2 % (ref 11.5–14.5)
FERRITIN SERPL-MCNC: 14 NG/ML (ref 26–388)
GLOBULIN SER CALC-MCNC: 3.3 G/DL (ref 2–4)
GLUCOSE SERPL-MCNC: 89 MG/DL (ref 65–100)
HCT VFR BLD AUTO: 40.1 % (ref 35–47)
HGB BLD-MCNC: 13.3 G/DL (ref 11.5–16)
IMM GRANULOCYTES # BLD AUTO: 0 K/UL (ref 0–0.04)
IMM GRANULOCYTES NFR BLD AUTO: 0 % (ref 0–0.5)
IRON SATN MFR SERPL: 14 % (ref 20–50)
IRON SERPL-MCNC: 52 UG/DL (ref 35–150)
LYMPHOCYTES # BLD: 2.9 K/UL (ref 0.8–3.5)
LYMPHOCYTES NFR BLD: 34 % (ref 12–49)
MCH RBC QN AUTO: 28.8 PG (ref 26–34)
MCHC RBC AUTO-ENTMCNC: 33.2 G/DL (ref 30–36.5)
MCV RBC AUTO: 86.8 FL (ref 80–99)
MONOCYTES # BLD: 0.6 K/UL (ref 0–1)
MONOCYTES NFR BLD: 7 % (ref 5–13)
NEUTS SEG # BLD: 4.3 K/UL (ref 1.8–8)
NEUTS SEG NFR BLD: 51 % (ref 32–75)
NRBC # BLD: 0 K/UL (ref 0–0.01)
NRBC BLD-RTO: 0 PER 100 WBC
PLATELET # BLD AUTO: 269 K/UL (ref 150–400)
PMV BLD AUTO: 11.2 FL (ref 8.9–12.9)
POTASSIUM SERPL-SCNC: 4.1 MMOL/L (ref 3.5–5.1)
PROT SERPL-MCNC: 8 G/DL (ref 6.4–8.2)
RBC # BLD AUTO: 4.62 M/UL (ref 3.8–5.2)
SODIUM SERPL-SCNC: 137 MMOL/L (ref 136–145)
TIBC SERPL-MCNC: 381 UG/DL (ref 250–450)
WBC # BLD AUTO: 8.4 K/UL (ref 3.6–11)

## 2024-05-08 ENCOUNTER — TELEPHONE (OUTPATIENT)
Age: 29
End: 2024-05-08

## 2024-05-08 RX ORDER — FERROUS SULFATE 325(65) MG
325 TABLET ORAL DAILY
Qty: 90 TABLET | Refills: 1 | Status: SHIPPED | OUTPATIENT
Start: 2024-05-08

## 2024-05-08 RX ORDER — METFORMIN HYDROCHLORIDE 500 MG/1
500 TABLET, EXTENDED RELEASE ORAL
Qty: 30 TABLET | Refills: 2 | Status: SHIPPED | OUTPATIENT
Start: 2024-05-08

## 2024-05-09 NOTE — TELEPHONE ENCOUNTER
Please contact pt and review recent labs and recommendations.    Letter printed to mail to pt with results.    Please review iron sulfate and metformin sent to pharmacy--as recommended in letter--after visit.

## 2024-07-25 ENCOUNTER — TELEPHONE (OUTPATIENT)
Age: 29
End: 2024-07-25

## 2024-07-25 NOTE — TELEPHONE ENCOUNTER
Called and talked to Pt about her MAW appointment and she stated that \" I will call the office back to make that appointment\"

## 2024-08-02 ENCOUNTER — TELEMEDICINE (OUTPATIENT)
Age: 29
End: 2024-08-02

## 2024-08-02 DIAGNOSIS — Z91.199 NO-SHOW FOR APPOINTMENT: Primary | ICD-10-CM

## 2024-08-02 NOTE — PROGRESS NOTES
Note entered/encounter closed for administrative reasons.    No future appointments.      Pt did not confirm appt prior to no-show today.  Appt made 7-24-24.    Note on scheduling today:

## 2024-08-05 DIAGNOSIS — R73.03 PREDIABETES: ICD-10-CM

## 2024-08-05 NOTE — TELEPHONE ENCOUNTER
Future Appointments:  No future appointments.     Last Appointment With Me:  8/2/2024     Requested Prescriptions     Pending Prescriptions Disp Refills    metFORMIN (GLUCOPHAGE-XR) 500 MG extended release tablet [Pharmacy Med Name: METFORMIN HCL  MG TABLET] 90 tablet      Sig: TAKE 1 TABLET BY MOUTH EVERY DAY WITH BREAKFAST

## 2024-08-06 DIAGNOSIS — R73.03 PREDIABETES: ICD-10-CM

## 2024-08-07 RX ORDER — METFORMIN HYDROCHLORIDE 500 MG/1
500 TABLET, EXTENDED RELEASE ORAL
Qty: 90 TABLET | Refills: 1 | Status: SHIPPED | OUTPATIENT
Start: 2024-08-07

## 2024-08-07 RX ORDER — METFORMIN HCL 500 MG
500 TABLET, EXTENDED RELEASE 24 HR ORAL
Qty: 30 TABLET | Refills: 2 | OUTPATIENT
Start: 2024-08-07

## 2024-08-08 NOTE — TELEPHONE ENCOUNTER
Refill request(s) approved--metformin.    Refill protocol details (computer-generated) reviewed, as available.    Requested Prescriptions     Signed Prescriptions Disp Refills    metFORMIN (GLUCOPHAGE-XR) 500 MG extended release tablet 90 tablet 1     Sig: TAKE 1 TABLET BY MOUTH EVERY DAY WITH BREAKFAST     Authorizing Provider: BOY VILLAR       Lab Results   Component Value Date    CREATININE 1.03 (H) 05/02/2024     No results found for: \"LABA1C\", \"[id number]\"

## 2024-08-13 ENCOUNTER — TELEMEDICINE (OUTPATIENT)
Age: 29
End: 2024-08-13
Payer: MEDICAID

## 2024-08-13 DIAGNOSIS — R73.03 PREDIABETES: ICD-10-CM

## 2024-08-13 PROCEDURE — 99213 OFFICE O/P EST LOW 20 MIN: CPT | Performed by: INTERNAL MEDICINE

## 2024-08-13 RX ORDER — METFORMIN HYDROCHLORIDE 500 MG/1
500 TABLET, EXTENDED RELEASE ORAL
Qty: 90 TABLET | Refills: 1 | Status: CANCELLED | OUTPATIENT
Start: 2024-08-13

## 2024-08-13 RX ORDER — ORAL SEMAGLUTIDE 3 MG/1
3 TABLET ORAL DAILY
Qty: 30 TABLET | Refills: 2 | Status: SHIPPED | OUTPATIENT
Start: 2024-08-13

## 2024-08-13 SDOH — ECONOMIC STABILITY: FOOD INSECURITY: WITHIN THE PAST 12 MONTHS, YOU WORRIED THAT YOUR FOOD WOULD RUN OUT BEFORE YOU GOT MONEY TO BUY MORE.: NEVER TRUE

## 2024-08-13 SDOH — ECONOMIC STABILITY: FOOD INSECURITY: WITHIN THE PAST 12 MONTHS, THE FOOD YOU BOUGHT JUST DIDN'T LAST AND YOU DIDN'T HAVE MONEY TO GET MORE.: NEVER TRUE

## 2024-08-13 SDOH — ECONOMIC STABILITY: INCOME INSECURITY: HOW HARD IS IT FOR YOU TO PAY FOR THE VERY BASICS LIKE FOOD, HOUSING, MEDICAL CARE, AND HEATING?: NOT HARD AT ALL

## 2024-08-13 ASSESSMENT — PATIENT HEALTH QUESTIONNAIRE - PHQ9
8. MOVING OR SPEAKING SO SLOWLY THAT OTHER PEOPLE COULD HAVE NOTICED. OR THE OPPOSITE, BEING SO FIGETY OR RESTLESS THAT YOU HAVE BEEN MOVING AROUND A LOT MORE THAN USUAL: NOT AT ALL
7. TROUBLE CONCENTRATING ON THINGS, SUCH AS READING THE NEWSPAPER OR WATCHING TELEVISION: NOT AT ALL
3. TROUBLE FALLING OR STAYING ASLEEP: NOT AT ALL
5. POOR APPETITE OR OVEREATING: NOT AT ALL
9. THOUGHTS THAT YOU WOULD BE BETTER OFF DEAD, OR OF HURTING YOURSELF: NOT AT ALL
SUM OF ALL RESPONSES TO PHQ QUESTIONS 1-9: 0
4. FEELING TIRED OR HAVING LITTLE ENERGY: NOT AT ALL
10. IF YOU CHECKED OFF ANY PROBLEMS, HOW DIFFICULT HAVE THESE PROBLEMS MADE IT FOR YOU TO DO YOUR WORK, TAKE CARE OF THINGS AT HOME, OR GET ALONG WITH OTHER PEOPLE: NOT DIFFICULT AT ALL
SUM OF ALL RESPONSES TO PHQ QUESTIONS 1-9: 0
SUM OF ALL RESPONSES TO PHQ9 QUESTIONS 1 & 2: 0
1. LITTLE INTEREST OR PLEASURE IN DOING THINGS: NOT AT ALL
2. FEELING DOWN, DEPRESSED OR HOPELESS: NOT AT ALL
6. FEELING BAD ABOUT YOURSELF - OR THAT YOU ARE A FAILURE OR HAVE LET YOURSELF OR YOUR FAMILY DOWN: NOT AT ALL

## 2024-08-13 NOTE — PROGRESS NOTES
RM: VV  Chief Complaint   Patient presents with    Medication Refill      There were no vitals filed for this visit.   FASTING: No  Have you been to the ER, urgent care clinic since your last visit?  Hospitalized since your last visit?\"    NO  “Have you seen or consulted any other health care providers outside of [facility] since your last visit?”    NO    Click Here for Release of Records Request   
refill metformin.  She has current refill for 1 tab x 500mg daily from 8-7-24 with #90 and 1 refill.    Sent to [facility] in [place].    She notes no interim problems.    Follow-up and mgt reviewed.      No results found for: \"LABA1C\", \"[id number]\"      Review of Systems    Prior to Visit Medications    Medication Sig Taking? Authorizing Provider   metFORMIN (GLUCOPHAGE-XR) 500 MG extended release tablet TAKE 1 TABLET BY MOUTH EVERY DAY WITH BREAKFAST Yes Alexsander Kang MD   ferrous sulfate (IRON 325) 325 (65 Fe) MG tablet Take 1 tablet by mouth Daily Iron best absorbed on empty stomach with vitamin C. Yes Alexsander Kang MD   labetalol (NORMODYNE) 200 MG tablet Take 1 tablet by mouth 2 times daily Decrease from 300mg two times daily. Yes Alexsander Kang MD   Semaglutide-Weight Management (WEGOVY) 0.5 MG/0.5ML SOAJ SC injection Inject 0.5 mg into the skin every 7 days Start in 4 weeks after 4 weekly 0.25mg SC injections. Yes Alexsander Kang MD   lurasidone (LATUDA) 60 MG TABS tablet Take 1 tablet by mouth Daily with supper Yes ProviderCarrie MD   polyethylene glycol (MIRALAX) 17 g PACK packet Take 17 g by mouth daily Yes Carrie Parker MD   Semaglutide-Weight Management (WEGOVY) 0.25 MG/0.5ML SOAJ SC injection Inject 0.25 mg into the skin every 7 days for 4 doses  Alexsander Kang MD       Social History     Tobacco Use    Smoking status: Former     Average packs/day: 0.5 packs/day for 5.0 years (2.5 ttl pk-yrs)     Types: Cigarettes     Start date: 2014    Smokeless tobacco: Never   Vaping Use    Vaping status: Every Day    Substances: Nicotine   Substance Use Topics    Alcohol use: Yes     Comment: OCC    Drug use: Not Currently        PHYSICAL EXAMINATION:   \"[x]\" Indicates a positive item  \"[]\" Indicates a negative item     Vital Signs: (As obtained by patient/caregiver or practitioner observation)         No data to display                 Constitutional: [x] Appears

## 2024-09-01 DIAGNOSIS — I10 ESSENTIAL HYPERTENSION: ICD-10-CM

## 2024-09-03 RX ORDER — LABETALOL 200 MG/1
200 TABLET, FILM COATED ORAL 2 TIMES DAILY
Qty: 180 TABLET | Refills: 1 | OUTPATIENT
Start: 2024-09-03

## 2024-09-03 NOTE — TELEPHONE ENCOUNTER
Duplicate request:   05/02/2024 Normodyne 200 mg #180 with 1 refill was sent to [id number].     For Pharmacy Admin Tracking Only    Program: Medication Refill  Intervention Detail: New Rx: 1, reason: Patient Preference  Time Spent (min): 5  Requested Prescriptions     Pending Prescriptions Disp Refills    labetalol (NORMODYNE) 200 MG tablet [Pharmacy Med Name: LABETALOL  MG TABLET] 180 tablet 1     Sig: TAKE 1 TABLET BY MOUTH 2 TIMES DAILY DECREASE FROM 300MG TWO TIMES DAILY.

## 2024-09-12 ENCOUNTER — TELEPHONE (OUTPATIENT)
Age: 29
End: 2024-09-12

## 2025-01-02 ENCOUNTER — HOSPITAL ENCOUNTER (EMERGENCY)
Facility: HOSPITAL | Age: 30
Discharge: HOME | End: 2025-01-02
Attending: EMERGENCY MEDICINE
Payer: MEDICARE

## 2025-01-02 ENCOUNTER — APPOINTMENT (OUTPATIENT)
Facility: HOSPITAL | Age: 30
End: 2025-01-02
Payer: MEDICARE

## 2025-01-02 VITALS
TEMPERATURE: 98.3 F | WEIGHT: 178.13 LBS | RESPIRATION RATE: 20 BRPM | HEART RATE: 72 BPM | SYSTOLIC BLOOD PRESSURE: 141 MMHG | DIASTOLIC BLOOD PRESSURE: 78 MMHG | BODY MASS INDEX: 35.91 KG/M2 | HEIGHT: 59 IN | OXYGEN SATURATION: 97 %

## 2025-01-02 DIAGNOSIS — R10.9 FLANK PAIN: Primary | ICD-10-CM

## 2025-01-02 LAB
ALBUMIN SERPL-MCNC: 4.7 G/DL (ref 3.5–5)
ALBUMIN/GLOB SERPL: 1.5 (ref 1.1–2.2)
ALP SERPL-CCNC: 60 U/L (ref 45–117)
ALT SERPL-CCNC: 24 U/L (ref 12–78)
ANION GAP SERPL CALC-SCNC: 11 MMOL/L (ref 2–12)
APPEARANCE UR: CLEAR
AST SERPL-CCNC: 14 U/L (ref 15–37)
BACTERIA URNS QL MICRO: NEGATIVE /HPF
BASOPHILS # BLD: 0.1 K/UL (ref 0–0.1)
BASOPHILS NFR BLD: 1 % (ref 0–1)
BILIRUB SERPL-MCNC: 1 MG/DL (ref 0.2–1)
BILIRUB UR QL: NEGATIVE
BUN SERPL-MCNC: 13 MG/DL (ref 6–20)
BUN/CREAT SERPL: 13 (ref 12–20)
CALCIUM SERPL-MCNC: 9.4 MG/DL (ref 8.5–10.1)
CHLORIDE SERPL-SCNC: 104 MMOL/L (ref 97–108)
CO2 SERPL-SCNC: 26 MMOL/L (ref 21–32)
COLOR UR: ABNORMAL
CREAT SERPL-MCNC: 1 MG/DL (ref 0.55–1.02)
D DIMER PPP FEU-MCNC: <0.19 MG/L FEU (ref 0–0.65)
DIFFERENTIAL METHOD BLD: NORMAL
EKG ATRIAL RATE: 68 BPM
EKG DIAGNOSIS: NORMAL
EKG P AXIS: 46 DEGREES
EKG P-R INTERVAL: 216 MS
EKG Q-T INTERVAL: 376 MS
EKG QRS DURATION: 88 MS
EKG QTC CALCULATION (BAZETT): 399 MS
EKG R AXIS: 49 DEGREES
EKG T AXIS: 53 DEGREES
EKG VENTRICULAR RATE: 68 BPM
EOSINOPHIL # BLD: 0.4 K/UL (ref 0–0.4)
EOSINOPHIL NFR BLD: 4 % (ref 0–7)
EPITH CASTS URNS QL MICRO: ABNORMAL /LPF
ERYTHROCYTE [DISTWIDTH] IN BLOOD BY AUTOMATED COUNT: 12.8 % (ref 11.5–14.5)
GLOBULIN SER CALC-MCNC: 3.2 G/DL (ref 2–4)
GLUCOSE SERPL-MCNC: 85 MG/DL (ref 65–100)
GLUCOSE UR STRIP.AUTO-MCNC: NEGATIVE MG/DL
HCG UR QL: NEGATIVE
HCT VFR BLD AUTO: 40.8 % (ref 35–47)
HGB BLD-MCNC: 14.2 G/DL (ref 11.5–16)
HGB UR QL STRIP: NEGATIVE
IMM GRANULOCYTES # BLD AUTO: 0 K/UL (ref 0–0.04)
IMM GRANULOCYTES NFR BLD AUTO: 0 % (ref 0–0.5)
KETONES UR QL STRIP.AUTO: NEGATIVE MG/DL
LEUKOCYTE ESTERASE UR QL STRIP.AUTO: ABNORMAL
LYMPHOCYTES # BLD: 3.1 K/UL (ref 0.8–3.5)
LYMPHOCYTES NFR BLD: 31 % (ref 12–49)
MCH RBC QN AUTO: 31.2 PG (ref 26–34)
MCHC RBC AUTO-ENTMCNC: 34.8 G/DL (ref 30–36.5)
MCV RBC AUTO: 89.7 FL (ref 80–99)
MONOCYTES # BLD: 0.6 K/UL (ref 0–1)
MONOCYTES NFR BLD: 7 % (ref 5–13)
NEUTS SEG # BLD: 5.6 K/UL (ref 1.8–8)
NEUTS SEG NFR BLD: 57 % (ref 32–75)
NITRITE UR QL STRIP.AUTO: NEGATIVE
NRBC # BLD: 0 K/UL (ref 0–0.01)
NRBC BLD-RTO: 0 PER 100 WBC
PH UR STRIP: 7 (ref 5–8)
PLATELET # BLD AUTO: 254 K/UL (ref 150–400)
PMV BLD AUTO: 10 FL (ref 8.9–12.9)
POTASSIUM SERPL-SCNC: 4 MMOL/L (ref 3.5–5.1)
PROT SERPL-MCNC: 7.9 G/DL (ref 6.4–8.2)
PROT UR STRIP-MCNC: NEGATIVE MG/DL
RBC # BLD AUTO: 4.55 M/UL (ref 3.8–5.2)
RBC #/AREA URNS HPF: ABNORMAL /HPF (ref 0–5)
SODIUM SERPL-SCNC: 141 MMOL/L (ref 136–145)
SP GR UR REFRACTOMETRY: 1.01 (ref 1–1.03)
TROPONIN I SERPL HS-MCNC: 4 NG/L (ref 0–51)
UROBILINOGEN UR QL STRIP.AUTO: 0.2 EU/DL (ref 0.2–1)
WBC # BLD AUTO: 9.8 K/UL (ref 3.6–11)
WBC URNS QL MICRO: ABNORMAL /HPF (ref 0–4)

## 2025-01-02 PROCEDURE — 93005 ELECTROCARDIOGRAM TRACING: CPT | Performed by: EMERGENCY MEDICINE

## 2025-01-02 PROCEDURE — 96374 THER/PROPH/DIAG INJ IV PUSH: CPT

## 2025-01-02 PROCEDURE — 84484 ASSAY OF TROPONIN QUANT: CPT

## 2025-01-02 PROCEDURE — 85379 FIBRIN DEGRADATION QUANT: CPT

## 2025-01-02 PROCEDURE — 93010 ELECTROCARDIOGRAM REPORT: CPT | Performed by: INTERNAL MEDICINE

## 2025-01-02 PROCEDURE — 36415 COLL VENOUS BLD VENIPUNCTURE: CPT

## 2025-01-02 PROCEDURE — 85025 COMPLETE CBC W/AUTO DIFF WBC: CPT

## 2025-01-02 PROCEDURE — 80053 COMPREHEN METABOLIC PANEL: CPT

## 2025-01-02 PROCEDURE — 81025 URINE PREGNANCY TEST: CPT

## 2025-01-02 PROCEDURE — 99285 EMERGENCY DEPT VISIT HI MDM: CPT

## 2025-01-02 PROCEDURE — 81001 URINALYSIS AUTO W/SCOPE: CPT

## 2025-01-02 PROCEDURE — 71045 X-RAY EXAM CHEST 1 VIEW: CPT

## 2025-01-02 PROCEDURE — 6360000002 HC RX W HCPCS: Performed by: EMERGENCY MEDICINE

## 2025-01-02 RX ORDER — KETOROLAC TROMETHAMINE 10 MG/1
10 TABLET, FILM COATED ORAL EVERY 6 HOURS PRN
Qty: 20 TABLET | Refills: 0 | Status: SHIPPED | OUTPATIENT
Start: 2025-01-02 | End: 2025-01-12

## 2025-01-02 RX ORDER — NAPROXEN 500 MG/1
500 TABLET ORAL 2 TIMES DAILY
Qty: 10 TABLET | Refills: 0 | Status: SHIPPED | OUTPATIENT
Start: 2025-01-02 | End: 2025-01-02

## 2025-01-02 RX ORDER — DEXTROAMPHETAMINE SACCHARATE, AMPHETAMINE ASPARTATE MONOHYDRATE, DEXTROAMPHETAMINE SULFATE AND AMPHETAMINE SULFATE 3.75; 3.75; 3.75; 3.75 MG/1; MG/1; MG/1; MG/1
15 CAPSULE, EXTENDED RELEASE ORAL EVERY MORNING
COMMUNITY

## 2025-01-02 RX ORDER — CEPHALEXIN 500 MG/1
500 CAPSULE ORAL 2 TIMES DAILY
Qty: 10 CAPSULE | Refills: 0 | Status: SHIPPED | OUTPATIENT
Start: 2025-01-02 | End: 2025-01-07

## 2025-01-02 RX ORDER — KETOROLAC TROMETHAMINE 30 MG/ML
30 INJECTION, SOLUTION INTRAMUSCULAR; INTRAVENOUS
Status: COMPLETED | OUTPATIENT
Start: 2025-01-02 | End: 2025-01-02

## 2025-01-02 RX ORDER — CYCLOBENZAPRINE HCL 10 MG
10 TABLET ORAL 3 TIMES DAILY PRN
Qty: 15 TABLET | Refills: 0 | Status: SHIPPED | OUTPATIENT
Start: 2025-01-02 | End: 2025-01-12

## 2025-01-02 RX ADMIN — KETOROLAC TROMETHAMINE 30 MG: 30 INJECTION, SOLUTION INTRAMUSCULAR at 14:29

## 2025-01-02 ASSESSMENT — PAIN SCALES - GENERAL
PAINLEVEL_OUTOF10: 6
PAINLEVEL_OUTOF10: 4
PAINLEVEL_OUTOF10: 6

## 2025-01-02 ASSESSMENT — PAIN - FUNCTIONAL ASSESSMENT
PAIN_FUNCTIONAL_ASSESSMENT: ACTIVITIES ARE NOT PREVENTED
PAIN_FUNCTIONAL_ASSESSMENT: 0-10

## 2025-01-02 ASSESSMENT — LIFESTYLE VARIABLES
HOW MANY STANDARD DRINKS CONTAINING ALCOHOL DO YOU HAVE ON A TYPICAL DAY: PATIENT DOES NOT DRINK
HOW OFTEN DO YOU HAVE A DRINK CONTAINING ALCOHOL: NEVER

## 2025-01-02 ASSESSMENT — PAIN DESCRIPTION - DESCRIPTORS: DESCRIPTORS: ACHING;SHARP;STABBING

## 2025-01-02 ASSESSMENT — PAIN DESCRIPTION - ORIENTATION: ORIENTATION: RIGHT

## 2025-01-02 ASSESSMENT — PAIN DESCRIPTION - PAIN TYPE: TYPE: ACUTE PAIN

## 2025-01-02 ASSESSMENT — PAIN DESCRIPTION - LOCATION: LOCATION: RIB CAGE

## 2025-01-02 NOTE — ED NOTES
Change of shift. Care of patient taken over from Dr. Rosa; H&P reviewed, handoff complete.    Patient here for right rib pain.  Is having some dysuria.  Urinalysis positive for leukocyte esterase cough given patient is symptomatic we will treat as UTI.  Was given Keflex otherwise chest x-ray shows no acute abnormalities D-dimer not elevated does not require CTA.  Patient was discharged with prescription for Naprosyn instructions to follow-up with PCP return as needed.    LABORATORY TESTS:  Recent Results (from the past 12 hour(s))   EKG 12 Lead (Chest Pain)    Collection Time: 01/02/25  1:20 PM   Result Value Ref Range    Ventricular Rate 68 BPM    Atrial Rate 68 BPM    P-R Interval 216 ms    QRS Duration 88 ms    Q-T Interval 376 ms    QTc Calculation (Bazett) 399 ms    P Axis 46 degrees    R Axis 49 degrees    T Axis 53 degrees    Diagnosis       Sinus rhythm with 1st degree AV block  Otherwise normal ECG  When compared with ECG of 30-DEC-2001 02:29,  PREVIOUS ECG IS PRESENT     CBC with Auto Differential    Collection Time: 01/02/25  1:31 PM   Result Value Ref Range    WBC 9.8 3.6 - 11.0 K/uL    RBC 4.55 3.80 - 5.20 M/uL    Hemoglobin 14.2 11.5 - 16.0 g/dL    Hematocrit 40.8 35.0 - 47.0 %    MCV 89.7 80.0 - 99.0 FL    MCH 31.2 26.0 - 34.0 PG    MCHC 34.8 30.0 - 36.5 g/dL    RDW 12.8 11.5 - 14.5 %    Platelets 254 150 - 400 K/uL    MPV 10.0 8.9 - 12.9 FL    Nucleated RBCs 0.0 0.0  WBC    nRBC 0.00 0.00 - 0.01 K/uL    Neutrophils % 57 32 - 75 %    Lymphocytes % 31 12 - 49 %    Monocytes % 7 5 - 13 %    Eosinophils % 4 0 - 7 %    Basophils % 1 0 - 1 %    Immature Granulocytes % 0 0 - 0.5 %    Neutrophils Absolute 5.6 1.8 - 8.0 K/UL    Lymphocytes Absolute 3.1 0.8 - 3.5 K/UL    Monocytes Absolute 0.6 0.0 - 1.0 K/UL    Eosinophils Absolute 0.4 0.0 - 0.4 K/UL    Basophils Absolute 0.1 0.0 - 0.1 K/UL    Immature Granulocytes Absolute 0.0 0.00 - 0.04 K/UL    Differential Type AUTOMATED     Comprehensive Metabolic  Panel    Collection Time: 01/02/25  1:31 PM   Result Value Ref Range    Sodium 141 136 - 145 mmol/L    Potassium 4.0 3.5 - 5.1 mmol/L    Chloride 104 97 - 108 mmol/L    CO2 26 21 - 32 mmol/L    Anion Gap 11 2 - 12 mmol/L    Glucose 85 65 - 100 mg/dL    BUN 13 6 - 20 MG/DL    Creatinine 1.00 0.55 - 1.02 MG/DL    BUN/Creatinine Ratio 13 12 - 20      Est, Glom Filt Rate 78 >60 ml/min/1.73m2    Calcium 9.4 8.5 - 10.1 MG/DL    Total Bilirubin 1.0 0.2 - 1.0 MG/DL    ALT 24 12 - 78 U/L    AST 14 (L) 15 - 37 U/L    Alk Phosphatase 60 45 - 117 U/L    Total Protein 7.9 6.4 - 8.2 g/dL    Albumin 4.7 3.5 - 5.0 g/dL    Globulin 3.2 2.0 - 4.0 g/dL    Albumin/Globulin Ratio 1.5 1.1 - 2.2     D-Dimer, Quantitative    Collection Time: 01/02/25  1:31 PM   Result Value Ref Range    D-Dimer, Quant <0.19 0.00 - 0.65 mg/L FEU   Troponin    Collection Time: 01/02/25  1:31 PM   Result Value Ref Range    Troponin, High Sensitivity 4 0 - 51 ng/L   Urinalysis with Microscopic    Collection Time: 01/02/25  2:23 PM   Result Value Ref Range    Color, UA YELLOW/STRAW      Appearance CLEAR CLEAR      Specific Gravity, UA 1.010 1.003 - 1.030      pH, Urine 7.0 5.0 - 8.0      Protein, UA Negative NEG mg/dL    Glucose, Ur Negative NEG mg/dL    Ketones, Urine Negative NEG mg/dL    Bilirubin, Urine Negative NEG      Blood, Urine Negative NEG      Urobilinogen, Urine 0.2 0.2 - 1.0 EU/dL    Nitrite, Urine Negative NEG      Leukocyte Esterase, Urine SMALL (A) NEG      WBC, UA 0-4 0 - 4 /hpf    RBC, UA 0-5 0 - 5 /hpf    Epithelial Cells, UA FEW FEW /lpf    BACTERIA, URINE Negative NEG /hpf   POC Pregnancy Urine Qual    Collection Time: 01/02/25  2:25 PM   Result Value Ref Range    Preg Test, Ur Negative NEG         IMAGING RESULTS:   XR CHEST PORTABLE   Final Result   No acute cardiopulmonary disease.             Electronically signed by Bishnu Jimenez MD          MEDICATIONS GIVEN:   Medications   ketorolac (TORADOL) injection 30 mg (30 mg IntraVENous Given

## 2025-01-02 NOTE — ED PROVIDER NOTES
pain          DISPOSITION/PLAN   DISPOSITION Decision To Discharge 01/02/2025 02:37:04 PM    PATIENT REFERRED TO:  Alexsander Kang MD  [address] [address] [address] [phone]    Schedule an appointment as soon as possible for a visit       [facility] EMERGENCY DEPT  [address]  [place] [facility] [place] [facility] [address] [phone]    As needed, If symptoms worsen      DISCHARGE MEDICATIONS:  New Prescriptions    CYCLOBENZAPRINE (FLEXERIL) 10 MG TABLET    Take 1 tablet by mouth 3 times daily as needed for Muscle spasms    KETOROLAC (TORADOL) 10 MG TABLET    Take 1 tablet by mouth every 6 hours as needed for Pain     Controlled Substances Monitoring:          No data to display                (Please note that portions of this note were completed with a voice recognition program.  Efforts were made to edit the dictations but occasionally words are mis-transcribed.)    Thanh Rosa MD (electronically signed)  Attending Emergency Physician            Thanh Rosa MD  01/02/25 [id number]

## 2025-02-08 DIAGNOSIS — R73.03 PREDIABETES: ICD-10-CM

## 2025-02-10 NOTE — TELEPHONE ENCOUNTER
Last appointment: 08/13/2024 Virtual visit MD Kang   Next appointment: Nothing scheduled   Previous refill encounter(s):   08/07/2024 Glucophage-XR #90 with 1 refill.     For Pharmacy Admin Tracking Only    Program: Medication Refill  Intervention Detail: New Rx: 1, reason: Patient Preference  Time Spent (min): 5    Requested Prescriptions     Pending Prescriptions Disp Refills    metFORMIN (GLUCOPHAGE-XR) 500 MG extended release tablet [Pharmacy Med Name: METFORMIN HCL  MG TABLET] 90 tablet 0     Sig: TAKE 1 TABLET BY MOUTH EVERY DAY WITH BREAKFAST

## 2025-02-11 RX ORDER — METFORMIN HYDROCHLORIDE 500 MG/1
500 TABLET, EXTENDED RELEASE ORAL
Qty: 90 TABLET | Refills: 0 | Status: SHIPPED | OUTPATIENT
Start: 2025-02-11

## 2025-02-11 NOTE — TELEPHONE ENCOUNTER
Refill request(s) approved--metformin--90-day supply with 0 refill(s).    Refill protocol details (computer-generated) reviewed, as available.      [employer] message to pt--to schedule follow-up appt.      Requested Prescriptions     Signed Prescriptions Disp Refills    metFORMIN (GLUCOPHAGE-XR) 500 MG extended release tablet 90 tablet 0     Sig: Take 1 tablet by mouth daily (with breakfast) NEEDS FOLLOW-UP appointment and labs.     Authorizing Provider: BOY VILLAR

## 2025-03-10 DIAGNOSIS — I10 ESSENTIAL HYPERTENSION: Primary | ICD-10-CM

## 2025-03-10 NOTE — TELEPHONE ENCOUNTER
Last appointment: 08/13/2024 Virtual visit MD Kang   Next appointment: Nothing scheduled   Previous refill encounter(s):   05/02/2024 Normodyne #180 with 1 refill.     For Pharmacy Admin Tracking Only    Program: Medication Refill  Intervention Detail: New Rx: 1, reason: Patient Preference  Time Spent (min): 5    Requested Prescriptions     Pending Prescriptions Disp Refills    labetalol (NORMODYNE) 300 MG tablet [Pharmacy Med Name: LABETALOL  MG TABLET] 180 tablet 0     Sig: TAKE 1 TABLET BY MOUTH TWICE A DAY

## 2025-03-23 RX ORDER — LABETALOL 200 MG/1
200 TABLET, FILM COATED ORAL 2 TIMES DAILY
Qty: 60 TABLET | Refills: 1 | Status: SHIPPED | OUTPATIENT
Start: 2025-03-23

## 2025-03-23 RX ORDER — LABETALOL 300 MG/1
300 TABLET, FILM COATED ORAL 2 TIMES DAILY
Qty: 60 TABLET | Refills: 1 | Status: SHIPPED | OUTPATIENT
Start: 2025-03-23 | End: 2025-03-23

## 2025-03-23 NOTE — TELEPHONE ENCOUNTER
Refill request(s) approved--labetalol--30-day supply with 1 refill(s).    Refill protocol details (computer-generated) reviewed, as available.      Requested Prescriptions     Pending Prescriptions Disp Refills    labetalol (NORMODYNE) 300 MG tablet [Pharmacy Med Name: LABETALOL  MG TABLET] 180 tablet 0     Sig: TAKE 1 TABLET BY MOUTH TWICE A DAY       BP Readings from Last 3 Encounters:   01/02/25 (!) 141/78   05/02/24 109/73   04/29/24 (!) 129/56     Pulse Readings from Last 3 Encounters:   01/02/25 72   05/02/24 64   04/29/24 67       She has not read Feb 2025 [employer] message to pt to schedule follow-up.    Please contact pt to schedule follow-up appt--in-office, since last appts have been virtual only.      Note:  Initially sent as 300mg BID refill, but last dose here was 200mg BID--decreased at last office visit.    Re-sent script as below;    Requested Prescriptions     Signed Prescriptions Disp Refills    labetalol (NORMODYNE) 200 MG tablet 60 tablet 1     Sig: Take 1 tablet by mouth 2 times daily Decrease from 300mg two times daily.  Needs office visit.  Replaces script for 300mg sent earlier today.     Authorizing Provider: BOY VILLAR text is new in this refill.

## 2025-03-25 ENCOUNTER — TELEPHONE (OUTPATIENT)
Age: 30
End: 2025-03-25

## 2025-03-25 NOTE — TELEPHONE ENCOUNTER
The patient had an appointment scheduled for 3/21/2025 but was not seen. Was advised to go to neurosurgery instead. She was asking that Dr. Cade send a referral to neurosurgery. I advised the patient to contact her PCP's office for that. She verbalized understanding.   
The patient's mom, Brian, called expressing her frustration about the patient not being seen. She is asking that Dr. Cade send the referral for neurosurgery. She states the patient should not have to get on the phone with another doctors office to get the referral when it was Dr. Cade who recommended she see neurosurgery.   
11

## 2025-04-23 DIAGNOSIS — I10 ESSENTIAL HYPERTENSION: ICD-10-CM

## 2025-04-25 RX ORDER — LABETALOL 200 MG/1
TABLET, FILM COATED ORAL
Qty: 180 TABLET | Refills: 1 | OUTPATIENT
Start: 2025-04-25

## 2025-04-25 NOTE — TELEPHONE ENCOUNTER
Duplicate request:   03/23/2025 Normodyne 200 mg #60 with 1 refill was sent to [facility] #[id number].     Per Dr. Medina pt needs an office visit.     Last visit 08/24    For Pharmacy Admin Tracking Only    Program: Medication Refill  Intervention Detail: Discontinued Rx: 1, reason: Duplicate Therapy  Time Spent (min): 5    Requested Prescriptions     Pending Prescriptions Disp Refills    labetalol (NORMODYNE) 200 MG tablet [Pharmacy Med Name: LABETALOL  MG TABLET] 180 tablet 1     Sig: TAKE 1 TABLET BY MOUTH TWICE A DAY **NEED APPT PER MD

## 2025-05-24 DIAGNOSIS — I10 ESSENTIAL HYPERTENSION: ICD-10-CM

## 2025-06-28 ENCOUNTER — APPOINTMENT (OUTPATIENT)
Facility: HOSPITAL | Age: 30
End: 2025-06-28
Payer: MEDICARE

## 2025-06-28 ENCOUNTER — HOSPITAL ENCOUNTER (EMERGENCY)
Facility: HOSPITAL | Age: 30
Discharge: HOME | End: 2025-06-28
Attending: STUDENT IN AN ORGANIZED HEALTH CARE EDUCATION/TRAINING PROGRAM
Payer: MEDICARE

## 2025-06-28 VITALS
OXYGEN SATURATION: 99 % | WEIGHT: 173.06 LBS | BODY MASS INDEX: 34.89 KG/M2 | RESPIRATION RATE: 18 BRPM | SYSTOLIC BLOOD PRESSURE: 120 MMHG | HEIGHT: 59 IN | HEART RATE: 77 BPM | TEMPERATURE: 98.1 F | DIASTOLIC BLOOD PRESSURE: 71 MMHG

## 2025-06-28 DIAGNOSIS — J40 BRONCHITIS: Primary | ICD-10-CM

## 2025-06-28 LAB
ALBUMIN SERPL-MCNC: 4.3 G/DL (ref 3.5–5)
ALBUMIN/GLOB SERPL: 1.4 (ref 1.1–2.2)
ALP SERPL-CCNC: 60 U/L (ref 45–117)
ALT SERPL-CCNC: 25 U/L (ref 12–78)
ANION GAP SERPL CALC-SCNC: 9 MMOL/L (ref 2–12)
AST SERPL-CCNC: 17 U/L (ref 15–37)
BASOPHILS # BLD: 0.06 K/UL (ref 0–0.1)
BASOPHILS NFR BLD: 0.7 % (ref 0–1)
BILIRUB SERPL-MCNC: 0.9 MG/DL (ref 0.2–1)
BUN SERPL-MCNC: 15 MG/DL (ref 6–20)
BUN/CREAT SERPL: 16 (ref 12–20)
CALCIUM SERPL-MCNC: 9.5 MG/DL (ref 8.5–10.1)
CHLORIDE SERPL-SCNC: 106 MMOL/L (ref 97–108)
CO2 SERPL-SCNC: 26 MMOL/L (ref 21–32)
CREAT SERPL-MCNC: 0.91 MG/DL (ref 0.55–1.02)
D DIMER PPP FEU-MCNC: <0.19 MG/L FEU (ref 0–0.65)
DIFFERENTIAL METHOD BLD: NORMAL
EOSINOPHIL # BLD: 0.27 K/UL (ref 0–0.4)
EOSINOPHIL NFR BLD: 3.1 % (ref 0–7)
ERYTHROCYTE [DISTWIDTH] IN BLOOD BY AUTOMATED COUNT: 12.5 % (ref 11.5–14.5)
GLOBULIN SER CALC-MCNC: 3.1 G/DL (ref 2–4)
GLUCOSE SERPL-MCNC: 89 MG/DL (ref 65–100)
HCG SERPL QL: NEGATIVE
HCT VFR BLD AUTO: 39.5 % (ref 35–47)
HGB BLD-MCNC: 13.7 G/DL (ref 11.5–16)
IMM GRANULOCYTES # BLD AUTO: 0.02 K/UL (ref 0–0.04)
IMM GRANULOCYTES NFR BLD AUTO: 0.2 % (ref 0–0.5)
LYMPHOCYTES # BLD: 2.92 K/UL (ref 0.8–3.5)
LYMPHOCYTES NFR BLD: 33.8 % (ref 12–49)
MAGNESIUM SERPL-MCNC: 1.8 MG/DL (ref 1.6–2.4)
MCH RBC QN AUTO: 30.7 PG (ref 26–34)
MCHC RBC AUTO-ENTMCNC: 34.7 G/DL (ref 30–36.5)
MCV RBC AUTO: 88.6 FL (ref 80–99)
MONOCYTES # BLD: 0.46 K/UL (ref 0–1)
MONOCYTES NFR BLD: 5.3 % (ref 5–13)
NEUTS SEG # BLD: 4.9 K/UL (ref 1.8–8)
NEUTS SEG NFR BLD: 56.9 % (ref 32–75)
NRBC # BLD: 0 K/UL (ref 0–0.01)
NRBC BLD-RTO: 0 PER 100 WBC
PLATELET # BLD AUTO: 239 K/UL (ref 150–400)
PMV BLD AUTO: 9.8 FL (ref 8.9–12.9)
POTASSIUM SERPL-SCNC: 3.7 MMOL/L (ref 3.5–5.1)
PROT SERPL-MCNC: 7.4 G/DL (ref 6.4–8.2)
RBC # BLD AUTO: 4.46 M/UL (ref 3.8–5.2)
SODIUM SERPL-SCNC: 141 MMOL/L (ref 136–145)
TROPONIN I SERPL HS-MCNC: 4 NG/L (ref 0–51)
TSH SERPL DL<=0.05 MIU/L-ACNC: 2.77 UIU/ML (ref 0.36–3.74)
WBC # BLD AUTO: 8.6 K/UL (ref 3.6–11)

## 2025-06-28 PROCEDURE — 99285 EMERGENCY DEPT VISIT HI MDM: CPT

## 2025-06-28 PROCEDURE — 6360000004 HC RX CONTRAST MEDICATION: Performed by: STUDENT IN AN ORGANIZED HEALTH CARE EDUCATION/TRAINING PROGRAM

## 2025-06-28 PROCEDURE — 71275 CT ANGIOGRAPHY CHEST: CPT

## 2025-06-28 PROCEDURE — 85379 FIBRIN DEGRADATION QUANT: CPT

## 2025-06-28 PROCEDURE — 85025 COMPLETE CBC W/AUTO DIFF WBC: CPT

## 2025-06-28 PROCEDURE — 93005 ELECTROCARDIOGRAM TRACING: CPT | Performed by: STUDENT IN AN ORGANIZED HEALTH CARE EDUCATION/TRAINING PROGRAM

## 2025-06-28 PROCEDURE — 84443 ASSAY THYROID STIM HORMONE: CPT

## 2025-06-28 PROCEDURE — 84484 ASSAY OF TROPONIN QUANT: CPT

## 2025-06-28 PROCEDURE — 2580000003 HC RX 258: Performed by: STUDENT IN AN ORGANIZED HEALTH CARE EDUCATION/TRAINING PROGRAM

## 2025-06-28 PROCEDURE — 80053 COMPREHEN METABOLIC PANEL: CPT

## 2025-06-28 PROCEDURE — 6370000000 HC RX 637 (ALT 250 FOR IP): Performed by: STUDENT IN AN ORGANIZED HEALTH CARE EDUCATION/TRAINING PROGRAM

## 2025-06-28 PROCEDURE — 84703 CHORIONIC GONADOTROPIN ASSAY: CPT

## 2025-06-28 PROCEDURE — 83735 ASSAY OF MAGNESIUM: CPT

## 2025-06-28 RX ORDER — IPRATROPIUM BROMIDE AND ALBUTEROL SULFATE 2.5; .5 MG/3ML; MG/3ML
1 SOLUTION RESPIRATORY (INHALATION)
Status: COMPLETED | OUTPATIENT
Start: 2025-06-28 | End: 2025-06-28

## 2025-06-28 RX ORDER — ACETAMINOPHEN 500 MG
1000 TABLET ORAL
Status: COMPLETED | OUTPATIENT
Start: 2025-06-28 | End: 2025-06-28

## 2025-06-28 RX ORDER — ALBUTEROL SULFATE 90 UG/1
2 INHALANT RESPIRATORY (INHALATION) 4 TIMES DAILY PRN
Qty: 18 G | Refills: 0 | Status: SHIPPED | OUTPATIENT
Start: 2025-06-28

## 2025-06-28 RX ORDER — 0.9 % SODIUM CHLORIDE 0.9 %
1000 INTRAVENOUS SOLUTION INTRAVENOUS ONCE
Status: COMPLETED | OUTPATIENT
Start: 2025-06-28 | End: 2025-06-28

## 2025-06-28 RX ORDER — PREDNISONE 50 MG/1
50 TABLET ORAL DAILY
Qty: 5 TABLET | Refills: 0 | Status: SHIPPED | OUTPATIENT
Start: 2025-06-28 | End: 2025-07-03

## 2025-06-28 RX ORDER — IOPAMIDOL 755 MG/ML
100 INJECTION, SOLUTION INTRAVASCULAR
Status: COMPLETED | OUTPATIENT
Start: 2025-06-28 | End: 2025-06-28

## 2025-06-28 RX ADMIN — ACETAMINOPHEN 1000 MG: 500 TABLET ORAL at 18:31

## 2025-06-28 RX ADMIN — IOPAMIDOL 100 ML: 755 INJECTION, SOLUTION INTRAVENOUS at 19:10

## 2025-06-28 RX ADMIN — IPRATROPIUM BROMIDE AND ALBUTEROL SULFATE 1 DOSE: .5; 3 SOLUTION RESPIRATORY (INHALATION) at 17:56

## 2025-06-28 RX ADMIN — SODIUM CHLORIDE 1000 ML: 0.9 INJECTION, SOLUTION INTRAVENOUS at 17:56

## 2025-06-28 ASSESSMENT — PAIN DESCRIPTION - PAIN TYPE: TYPE: ACUTE PAIN

## 2025-06-28 ASSESSMENT — PAIN - FUNCTIONAL ASSESSMENT
PAIN_FUNCTIONAL_ASSESSMENT: 0-10
PAIN_FUNCTIONAL_ASSESSMENT: NONE - DENIES PAIN

## 2025-06-28 ASSESSMENT — PAIN SCALES - GENERAL: PAINLEVEL_OUTOF10: 3

## 2025-06-28 ASSESSMENT — PAIN DESCRIPTION - DESCRIPTORS: DESCRIPTORS: PRESSURE

## 2025-06-28 ASSESSMENT — PAIN DESCRIPTION - LOCATION: LOCATION: CHEST

## 2025-06-28 ASSESSMENT — ENCOUNTER SYMPTOMS
NAUSEA: 1
WHEEZING: 1

## 2025-06-28 NOTE — DISCHARGE INSTRUCTIONS
Please follow-up with your PCP, stop vaping.  The steroids for the full course.  You can use the inhaler as needed.  Return as needed.

## 2025-06-28 NOTE — ED TRIAGE NOTES
Pt ambulates to treatment are c/o chest pain since Thursday with nausea and dizziness. Pt reports Left side head pain. Pt states I just don't feel right. Pt hx of PE 2023 with postpartum hemorrhage.

## 2025-06-28 NOTE — ED PROVIDER NOTES
[facility]  EMERGENCY DEPARTMENT ENCOUNTER      Pt Name: Trang Vitale  MRN: [id number]  Birthdate 1995  Date of evaluation: 2025  Provider: Akin Oseguera DO    CHIEF COMPLAINT       Chief Complaint   Patient presents with    Chest Pain         HISTORY OF PRESENT ILLNESS   (Location/Symptom, Timing/Onset, Context/Setting, Quality, Duration, Modifying Factors, Severity)  Note limiting factors.   29-year-old female presents ED for evaluation of left-sided chest pain that she has had for several days.  She has had some associated nausea and dizziness with it.  Had a headache as well.  History of PE in  completed a course of 6 months of anticoagulants.  This was provoked and is no longer on anticoagulants.  Patient vapes, no history of lung conditions.            Review of External Medical Records:     Nursing Notes were reviewed.    REVIEW OF SYSTEMS    (2-9 systems for level 4, 10 or more for level 5)     Review of Systems   Constitutional:  Negative for fever.   Respiratory:  Positive for wheezing.    Cardiovascular:  Positive for chest pain.   Gastrointestinal:  Positive for nausea.   Neurological:  Positive for headaches.       Except as noted above the remainder of the review of systems was reviewed and negative.       PAST MEDICAL HISTORY     Past Medical History:   Diagnosis Date    ADHD (attention deficit hyperactivity disorder)     Anxiety     Bipolar 1 disorder (HCC)     Hypertension     Pulmonary embolism (HCC)          SURGICAL HISTORY       Past Surgical History:   Procedure Laterality Date     SECTION       SECTION      2 total         CURRENT MEDICATIONS       Discharge Medication List as of 2025  8:00 PM        CONTINUE these medications which have NOT CHANGED    Details   labetalol (NORMODYNE) 200 MG tablet Take 1 tablet by mouth 2 times daily Decrease from 300mg two times daily.  Needs office visit.  Replaces script for 300mg sent earlier

## 2025-06-29 LAB
EKG ATRIAL RATE: 86 BPM
EKG DIAGNOSIS: NORMAL
EKG P AXIS: 38 DEGREES
EKG P-R INTERVAL: 192 MS
EKG Q-T INTERVAL: 336 MS
EKG QRS DURATION: 86 MS
EKG QTC CALCULATION (BAZETT): 402 MS
EKG R AXIS: 37 DEGREES
EKG T AXIS: 21 DEGREES
EKG VENTRICULAR RATE: 86 BPM

## 2025-06-29 PROCEDURE — 93010 ELECTROCARDIOGRAM REPORT: CPT | Performed by: SPECIALIST

## 2025-08-28 RX ORDER — LABETALOL 200 MG/1
TABLET, FILM COATED ORAL
Qty: 60 TABLET | Refills: 0 | OUTPATIENT
Start: 2025-08-28

## 2025-09-06 ENCOUNTER — HOSPITAL ENCOUNTER (EMERGENCY)
Facility: HOSPITAL | Age: 30
Discharge: HOME | End: 2025-09-06
Attending: STUDENT IN AN ORGANIZED HEALTH CARE EDUCATION/TRAINING PROGRAM
Payer: MEDICARE

## 2025-09-06 VITALS
DIASTOLIC BLOOD PRESSURE: 81 MMHG | HEART RATE: 78 BPM | OXYGEN SATURATION: 96 % | RESPIRATION RATE: 18 BRPM | TEMPERATURE: 98.2 F | SYSTOLIC BLOOD PRESSURE: 185 MMHG

## 2025-09-06 DIAGNOSIS — M54.50 ACUTE RIGHT-SIDED LOW BACK PAIN WITHOUT SCIATICA: Primary | ICD-10-CM

## 2025-09-06 LAB
APPEARANCE UR: CLEAR
BACTERIA URNS QL MICRO: NEGATIVE /HPF
BILIRUB UR QL: NEGATIVE
COLOR UR: ABNORMAL
EPITH CASTS URNS QL MICRO: ABNORMAL /LPF
GLUCOSE UR STRIP.AUTO-MCNC: NEGATIVE MG/DL
HCG UR QL: NEGATIVE
HGB UR QL STRIP: ABNORMAL
KETONES UR QL STRIP.AUTO: NEGATIVE MG/DL
LEUKOCYTE ESTERASE UR QL STRIP.AUTO: NEGATIVE
NITRITE UR QL STRIP.AUTO: NEGATIVE
PH UR STRIP: 6.5 (ref 5–8)
PROT UR STRIP-MCNC: NEGATIVE MG/DL
RBC #/AREA URNS HPF: ABNORMAL /HPF (ref 0–5)
SP GR UR REFRACTOMETRY: 1.02 (ref 1–1.03)
UROBILINOGEN UR QL STRIP.AUTO: 0.2 EU/DL (ref 0.2–1)
WBC URNS QL MICRO: ABNORMAL /HPF (ref 0–4)
YEAST URNS QL MICRO: PRESENT

## 2025-09-06 PROCEDURE — 81001 URINALYSIS AUTO W/SCOPE: CPT

## 2025-09-06 PROCEDURE — 81025 URINE PREGNANCY TEST: CPT

## 2025-09-06 RX ORDER — ONDANSETRON 4 MG/1
4 TABLET, ORALLY DISINTEGRATING ORAL 3 TIMES DAILY PRN
Qty: 21 TABLET | Refills: 0 | Status: SHIPPED | OUTPATIENT
Start: 2025-09-06

## 2025-09-06 ASSESSMENT — PAIN DESCRIPTION - ORIENTATION: ORIENTATION: RIGHT

## 2025-09-06 ASSESSMENT — PAIN DESCRIPTION - PAIN TYPE: TYPE: ACUTE PAIN

## 2025-09-06 ASSESSMENT — PAIN DESCRIPTION - LOCATION: LOCATION: FLANK

## 2025-09-06 ASSESSMENT — PAIN DESCRIPTION - DESCRIPTORS: DESCRIPTORS: ACHING;SHARP

## 2025-09-06 ASSESSMENT — PAIN SCALES - GENERAL: PAINLEVEL_OUTOF10: 6

## 2025-09-06 ASSESSMENT — PAIN - FUNCTIONAL ASSESSMENT
PAIN_FUNCTIONAL_ASSESSMENT: 0-10
PAIN_FUNCTIONAL_ASSESSMENT: ACTIVITIES ARE NOT PREVENTED